# Patient Record
Sex: MALE | Employment: UNEMPLOYED | URBAN - METROPOLITAN AREA
[De-identification: names, ages, dates, MRNs, and addresses within clinical notes are randomized per-mention and may not be internally consistent; named-entity substitution may affect disease eponyms.]

---

## 2018-03-28 ENCOUNTER — HOSPITAL ENCOUNTER (EMERGENCY)
Age: 68
Discharge: STILL ADMITTED OR ADMINISTRATIVE | End: 2018-03-28
Attending: EMERGENCY MEDICINE

## 2018-03-28 PROCEDURE — 75810000275 HC EMERGENCY DEPT VISIT NO LEVEL OF CARE

## 2022-07-27 ENCOUNTER — HOSPITAL ENCOUNTER (OUTPATIENT)
Dept: WOUND CARE | Age: 72
Discharge: HOME | End: 2022-07-27
Payer: MEDICARE

## 2022-07-27 VITALS
TEMPERATURE: 97.9 F | HEART RATE: 89 BPM | SYSTOLIC BLOOD PRESSURE: 114 MMHG | DIASTOLIC BLOOD PRESSURE: 69 MMHG | RESPIRATION RATE: 18 BRPM

## 2022-07-27 DIAGNOSIS — G47.33 OBSTRUCTIVE SLEEP APNEA SYNDROME: ICD-10-CM

## 2022-07-27 DIAGNOSIS — L97.922 NON-PRESSURE CHRONIC ULCER OF LEFT LOWER LEG WITH FAT LAYER EXPOSED (HCC): Primary | ICD-10-CM

## 2022-07-27 DIAGNOSIS — L97.922 NON-PRESSURE CHRONIC ULCER OF LEFT LOWER LEG WITH FAT LAYER EXPOSED (HCC): ICD-10-CM

## 2022-07-27 DIAGNOSIS — R60.0 BILATERAL LEG EDEMA: ICD-10-CM

## 2022-07-27 PROBLEM — I10 HTN (HYPERTENSION): Status: ACTIVE | Noted: 2022-07-27

## 2022-07-27 PROCEDURE — 99203 OFFICE O/P NEW LOW 30 MIN: CPT

## 2022-07-27 PROCEDURE — 29581 APPL MULTLAYER CMPRN SYS LEG: CPT

## 2022-07-27 PROCEDURE — 99203 OFFICE O/P NEW LOW 30 MIN: CPT | Performed by: SURGERY

## 2022-07-27 RX ORDER — BETAMETHASONE DIPROPIONATE 0.5 MG/G
OINTMENT TOPICAL ONCE
Status: CANCELLED | OUTPATIENT
Start: 2022-07-27 | End: 2022-07-27

## 2022-07-27 RX ORDER — VALSARTAN AND HYDROCHLOROTHIAZIDE 160; 12.5 MG/1; MG/1
1 TABLET, FILM COATED ORAL DAILY
COMMUNITY

## 2022-07-27 RX ORDER — FUROSEMIDE 20 MG/1
40 TABLET ORAL 2 TIMES DAILY
COMMUNITY
Start: 2022-08-17

## 2022-07-27 RX ORDER — LIDOCAINE HYDROCHLORIDE 20 MG/ML
JELLY TOPICAL ONCE
Status: CANCELLED | OUTPATIENT
Start: 2022-07-27 | End: 2022-07-27

## 2022-07-27 RX ORDER — GENTAMICIN SULFATE 1 MG/G
OINTMENT TOPICAL ONCE
Status: CANCELLED | OUTPATIENT
Start: 2022-07-27 | End: 2022-07-27

## 2022-07-27 RX ORDER — BACITRACIN ZINC AND POLYMYXIN B SULFATE 500; 1000 [USP'U]/G; [USP'U]/G
OINTMENT TOPICAL ONCE
Status: CANCELLED | OUTPATIENT
Start: 2022-07-27 | End: 2022-07-27

## 2022-07-27 RX ORDER — TRIAMCINOLONE ACETONIDE 1 MG/G
OINTMENT TOPICAL ONCE
Status: CANCELLED | OUTPATIENT
Start: 2022-07-27 | End: 2022-07-27

## 2022-07-27 RX ORDER — LIDOCAINE 40 MG/G
CREAM TOPICAL ONCE
Status: CANCELLED | OUTPATIENT
Start: 2022-07-27 | End: 2022-07-27

## 2022-07-27 RX ORDER — MUPIROCIN 20 MG/G
OINTMENT TOPICAL ONCE
Status: CANCELLED | OUTPATIENT
Start: 2022-07-27 | End: 2022-07-27

## 2022-07-27 RX ORDER — SILVER SULFADIAZINE 10 G/1000G
CREAM TOPICAL ONCE
Status: CANCELLED | OUTPATIENT
Start: 2022-07-27 | End: 2022-07-27

## 2022-07-27 RX ORDER — CLOBETASOL PROPIONATE 0.5 MG/G
OINTMENT TOPICAL ONCE
Status: CANCELLED | OUTPATIENT
Start: 2022-07-27 | End: 2022-07-27

## 2022-07-27 RX ORDER — LIDOCAINE HYDROCHLORIDE 40 MG/ML
SOLUTION TOPICAL ONCE
Status: CANCELLED | OUTPATIENT
Start: 2022-07-27 | End: 2022-07-27

## 2022-07-27 RX ORDER — BACITRACIN 500 [USP'U]/G
OINTMENT TOPICAL ONCE
Status: CANCELLED | OUTPATIENT
Start: 2022-07-27 | End: 2022-07-27

## 2022-07-27 NOTE — WOUND CARE
07/27/22 1349   Wound Leg lower Left;Lateral 07/27/22   Date First Assessed/Time First Assessed: 07/27/22 1345   Wound Approximate Age at First Assessment (Weeks): 4 weeks  Primary Wound Type: Vascular  Location: Leg lower  Wound Location Orientation: Left;Lateral  Date of First Observation: 07/27/22   Wound Image    Wound Etiology Other (Comment)  (Resembles Venous ulcer)   Dressing Status Old drainage noted   Cleansed Soap and water;Cleansed with saline  (Removed gauze, roll gauze)   Dressing/Treatment Other (Comment)   Wound Length (cm) 2.3 cm   Wound Width (cm) 2.3 cm   Wound Depth (cm) 0.2 cm   Wound Surface Area (cm^2) 5.29 cm^2   Wound Volume (cm^3) 1. 058 cm^3   Wound Assessment Slough;Pink/red   Drainage Amount Moderate   Drainage Description Serous   Wound Odor None   Sarah-Wound/Incision Assessment Edematous  (Dry, crusted)   Edges Undefined edges   Wound Thickness Description Full thickness

## 2022-07-27 NOTE — H&P
[address]  HISTORY AND PHYSICAL    Name:  Conrad Karimi  MR#:  [id number]  :  1950  ACCOUNT #:  [de-identified]  ADMIT DATE:  2022    HISTORY OF PRESENT ILLNESS:  The patient is a 57-year-old man who is referred to wound care center regarding a nonhealing wound on the left lower leg. The patient reports the wound has been present for about a month. He does report a chronic history of swelling of the legs, with left leg being larger. He has had previous ulceration on the left leg. The patient does not have any specific history of blood clot in the left leg. The patient does not have history of diabetes mellitus. He does not report anginal symptoms and does not have history of MI or coronary intervention. He is ambulatory. He reports some dyspnea with exertion, but says he could walk 100 yards without stopping. The patient sleeps lying in bed. He does not sleep sitting in a chair. The patient's past medical history includes hypertension and sleep apnea. He tried but was not successful using CPAP. The patient's medications include furosemide 20 mg per day. He reports this does produce a diuresis. He reports he drinks about a gallon of water per day. PHYSICAL EXAMINATION:  VITAL SIGNS:  Blood pressure 114/69, pulse 89, respirations 18, temperature 97.9. GENERAL:  The patient is an alert, overweight man in no acute distress. HEAD AND NECK:  Examination showed no jaundice. LUNGS:  Clear bilaterally without rales, rhonchi or wheezes. HEART:  Regular without murmur, gallop or rub. NEUROLOGIC:  The patient is alert and oriented. He moves all extremities equally. Facial movement is symmetrical.  Speech is normal.    WOUND EXAMINATION:  Examination of the right lower extremity revealed 2+ dorsalis pedis pulse. There was 1+ pitting edema from the knee downward on the right. There was a suggestion of some dilated veins in the right lower leg.   No ulcerations present in the right lower leg. Examination of the left lower extremity revealed 2+ dorsalis pedis pulse. There was 2+ pitting edema from the knee distally on the left. There is ulcer on the lateral distal aspect of the left lower leg which is 2.3 x 2.3 x 0.2 cm in dimension with slough on its base. I explained to the patient that his ulcer is likely related to chronic edema in the leg. Potential sources of edema reviewed. I ordered bilateral duplex ultrasound to assess for venous insufficiency. I explained to the patient that since he is taking a diuretic for treatment of his leg swelling, he should not drink excess fluid. As a start, I would recommend reducing daily water intake from one gallon per day to one half a gallon per day. Normally, if an individual has leg edema and is receiving a diuretic, I would recommend that he drink moderate amounts of fluid with meals and little fluid between meals. I also discussed with the patient the need to avoid salt intake in his diet. Dressing ordered:  Apply Xeroform over ulcer, apply two-layer compression wrap with calamine from base of toes to upper calf at 0.50 stretch. The patient will need to use a cast cover for showering. The patient will follow up in wound care center in 1 week. FINAL DIAGNOSIS:  Nonpressure ulcer left lower leg with fat layer exposed, bilateral leg edema, left greater than right, untreated sleep apnea.       Yamile Burgos MD      [id number]  D:  07/27/2022 14:25  T:  07/27/2022 19:20  JOB #:  [id number]

## 2022-07-27 NOTE — DISCHARGE INSTRUCTIONS
Discharge Instructions [address], [address] [place] [address] Laya Starkey, [id number]  Telephone: [phone]    NAME:  Angelica Yost  YOB: 1950  MEDICAL RECORD NUMBER:  [id number]  DATE: 07/27/2022    WOUND CARE ORDERS:  Left Lateral Lower Leg wound - Cleanse with Normal Saline or mild soap & water. Apply Xeroform, cover w/gauze or ABD pad, followed by 2 layer compression wrap w/Calamine. Dressing to be changed 1 x week, in clinic. Return to clinic in 1 week for follow up. Testing Ordered : Venous Duplex Ultrasound Studies, Bilateral Lower Extremities. (Left Lower Leg Ulcer, Bilateral Lower Extremity Edema) @ [facility]. Call [address] [place] [address] for Nurse Visit after Venous testing if they are not able to reapply dressing/compression wrap. TREATMENT ORDERS:    Elevate leg(s) when sitting. Avoid prolonged standing in one place. Do not get dressing/wrap wet. You may use a \"cast cover\" to protect wrap when showering. Follow Diet as prescribed:   [] Diet as tolerated: [] Calorie Diabetic Diet: Low carb and no Sugar [x] No Added Salt  [] Increase Protein: [x] Limit the amount of liquid you are drinking and avoid drinking in between meals           Return Appointment:  [x] Return Appointment: With DR Prachi Dowell  in  1 Week(s)        Ordered Tests - Venous Duplex Ultrasound Studies of right & left legs @ [facility] on Friday, July 29th @ 1:00 pm. Please arrive at 12:30 pm.     Electronically signed Mariana Clark RN on 7/27/2022 at 2:12 PM     Kamilla Lazar 281: Should you experience any significant changes in your wound(s) or have questions about your wound care, please contact the [address] [place] [address] at [address] [place] [address] 8:00 am - 4:30.   If you need help with your wound outside these hours and cannot wait until we are again available, contact your PCP or go to the [facility] emergency room. PLEASE NOTE: IF YOU ARE UNABLE TO OBTAIN WOUND SUPPLIES, CONTINUE TO USE THE SUPPLIES YOU HAVE AVAILABLE UNTIL YOU ARE ABLE TO REACH US. IT IS MOST IMPORTANT TO KEEP THE WOUND COVERED AT ALL TIMES.      Physician Signature:_______________________    Date: ___________ Time:  ____________

## 2022-07-27 NOTE — WOUND CARE
Multilayer Compression Wrap   (Not Unna) Below the Knee    NAME:  Nasrin Brock  YOB: 1950  MEDICAL RECORD NUMBER:  [id number]  DATE:  7/27/2022    Removed old Multilayer wrap if indicated and wash leg with mild soap/water. Applied primary and secondary dressing as ordered. Applied multilayered dressing below the knee to left lower leg. Instructed patient/caregiver not to remove dressing and to keep it clean and dry. Instructed patient/caregiver on complications to report to provider, such as pain, numbness in toes, heavy drainage, and slippage of dressing. Instructed patient on purpose of compression dressing and on activity and exercise recommendations. 07/27/22 1420   Wound Leg lower Left;Lateral 07/27/22   Date First Assessed/Time First Assessed: 07/27/22 1345   Wound Approximate Age at First Assessment (Weeks): 4 weeks  Primary Wound Type: Vascular  Location: Leg lower  Wound Location Orientation: Left;Lateral  Date of First Observation: 07/27/22   Dressing Status New dressing applied   Cleansed Cleansed with saline   Dressing/Treatment Xeroform;Gauze dressing/dressing sponge;ABD pad; Other (Comment)  (2 layer compression wrap w/Calamine.)         Electronically signed by Ahmed Libman, RN on 7/27/2022 at 2:46 PM

## 2022-08-03 ENCOUNTER — HOSPITAL ENCOUNTER (OUTPATIENT)
Dept: WOUND CARE | Age: 72
Discharge: HOME | End: 2022-08-03
Payer: MEDICARE

## 2022-08-03 VITALS
DIASTOLIC BLOOD PRESSURE: 82 MMHG | TEMPERATURE: 97.9 F | RESPIRATION RATE: 18 BRPM | SYSTOLIC BLOOD PRESSURE: 138 MMHG | HEART RATE: 100 BPM

## 2022-08-03 DIAGNOSIS — L97.922 NON-PRESSURE CHRONIC ULCER OF LEFT LOWER LEG WITH FAT LAYER EXPOSED (HCC): Primary | ICD-10-CM

## 2022-08-03 DIAGNOSIS — G47.33 OBSTRUCTIVE SLEEP APNEA SYNDROME: ICD-10-CM

## 2022-08-03 DIAGNOSIS — R60.0 BILATERAL LEG EDEMA: ICD-10-CM

## 2022-08-03 PROCEDURE — 99214 OFFICE O/P EST MOD 30 MIN: CPT | Performed by: SURGERY

## 2022-08-03 PROCEDURE — 29581 APPL MULTLAYER CMPRN SYS LEG: CPT

## 2022-08-03 RX ORDER — BACITRACIN 500 [USP'U]/G
OINTMENT TOPICAL ONCE
Status: CANCELLED | OUTPATIENT
Start: 2022-08-03 | End: 2022-08-03

## 2022-08-03 RX ORDER — LIDOCAINE 40 MG/G
CREAM TOPICAL ONCE
Status: CANCELLED | OUTPATIENT
Start: 2022-08-03 | End: 2022-08-03

## 2022-08-03 RX ORDER — MUPIROCIN 20 MG/G
OINTMENT TOPICAL ONCE
Status: CANCELLED | OUTPATIENT
Start: 2022-08-03 | End: 2022-08-03

## 2022-08-03 RX ORDER — LIDOCAINE HYDROCHLORIDE 40 MG/ML
SOLUTION TOPICAL ONCE
Status: CANCELLED | OUTPATIENT
Start: 2022-08-03 | End: 2022-08-03

## 2022-08-03 RX ORDER — SILVER SULFADIAZINE 10 G/1000G
CREAM TOPICAL ONCE
Status: CANCELLED | OUTPATIENT
Start: 2022-08-03 | End: 2022-08-03

## 2022-08-03 RX ORDER — TRIAMCINOLONE ACETONIDE 1 MG/G
OINTMENT TOPICAL ONCE
Status: CANCELLED | OUTPATIENT
Start: 2022-08-03 | End: 2022-08-03

## 2022-08-03 RX ORDER — GENTAMICIN SULFATE 1 MG/G
OINTMENT TOPICAL ONCE
Status: CANCELLED | OUTPATIENT
Start: 2022-08-03 | End: 2022-08-03

## 2022-08-03 RX ORDER — BETAMETHASONE DIPROPIONATE 0.5 MG/G
OINTMENT TOPICAL ONCE
Status: CANCELLED | OUTPATIENT
Start: 2022-08-03 | End: 2022-08-03

## 2022-08-03 RX ORDER — LIDOCAINE HYDROCHLORIDE 20 MG/ML
JELLY TOPICAL ONCE
Status: CANCELLED | OUTPATIENT
Start: 2022-08-03 | End: 2022-08-03

## 2022-08-03 RX ORDER — BACITRACIN ZINC AND POLYMYXIN B SULFATE 500; 1000 [USP'U]/G; [USP'U]/G
OINTMENT TOPICAL ONCE
Status: CANCELLED | OUTPATIENT
Start: 2022-08-03 | End: 2022-08-03

## 2022-08-03 RX ORDER — CLOBETASOL PROPIONATE 0.5 MG/G
OINTMENT TOPICAL ONCE
Status: CANCELLED | OUTPATIENT
Start: 2022-08-03 | End: 2022-08-03

## 2022-08-03 NOTE — WOUND CARE
08/03/22 1527   Wound Leg lower Left;Lateral 07/27/22   Date First Assessed/Time First Assessed: 07/27/22 1345   Wound Approximate Age at First Assessment (Weeks): 4 weeks  Primary Wound Type: Vascular  Location: Leg lower  Wound Location Orientation: Left;Lateral  Date of First Observation: 07/27/22   Dressing/Treatment   (Xeroform, ABD, 2 layer with calamine)   Multilayer Compression Wrap   (Not Unna) Below the Knee    NAME:  Genny Blount  YOB: 1950  MEDICAL RECORD NUMBER:  [id number]  DATE:  8/3/2022    Removed old Multilayer wrap if indicated and wash leg with mild soap/water. Applied moisturizing agent to dry skin as needed. Applied primary and secondary dressing as ordered. Applied multilayered dressing below the knee to left lower leg. Instructed patient/caregiver not to remove dressing and to keep it clean and dry. Instructed patient/caregiver on complications to report to provider, such as pain, numbness in toes, heavy drainage, and slippage of dressing. Instructed patient on purpose of compression dressing and on activity and exercise recommendations.     Response to treatment: Well tolerated by patient       Electronically signed by Veronica Garvey RN on 8/3/2022 at 3:28 PM

## 2022-08-03 NOTE — WOUND CARE
08/03/22 1511   Left Leg Edema Point of Measurement   Leg circumference 46 cm   Ankle circumference 30 cm   Compression Therapy 2 layer compression wrap   LLE Peripheral Vascular    Capillary Refill Less than/equal to 3 seconds   Color Appropriate for race   Temperature Warm   Sensation Present   Pedal Pulse Palpable   Wound Leg lower Left;Lateral 07/27/22   Date First Assessed/Time First Assessed: 07/27/22 1345   Wound Approximate Age at First Assessment (Weeks): 4 weeks  Primary Wound Type: Vascular  Location: Leg lower  Wound Location Orientation: Left;Lateral  Date of First Observation: 07/27/22   Wound Image    Wound Etiology Venous   Dressing Status Old drainage noted   Cleansed Cleansed with saline   Dressing/Treatment   (Xeroform, G, RG, 2 layer with calamine)   Wound Length (cm) 1.8 cm   Wound Width (cm) 2.2 cm   Wound Depth (cm) 0.2 cm   Wound Surface Area (cm^2) 3.96 cm^2   Change in Wound Size % 25.14   Wound Volume (cm^3) 0.792 cm^3   Wound Healing % 25   Wound Assessment Slough;Pink/red   Drainage Amount Moderate   Drainage Description Serous   Wound Odor None   Sarah-Wound/Incision Assessment Intact   Edges Defined edges   Wound Thickness Description Full thickness   Visit Vitals  /82   Pulse 100   Temp 97.9 °F (36.6 °C)   Resp 18

## 2022-08-03 NOTE — PROGRESS NOTES
HISTORY OF PRESENT ILLNESS:  The patient is a 65-year-old man who is referred to wound care center regarding a nonhealing wound on the left lower leg. The patient was first seen at the [address] [place] [address] on 7/27/2022. The patient reports the wound has been present for about a month. He does report a chronic history of swelling of the legs, with left leg being larger. He has had previous ulceration on the left leg. The patient does not have any specific history of blood clot in the left leg. The patient does not have history of diabetes mellitus. He does not report anginal symptoms and does not have history of MI or coronary intervention. He is ambulatory. He reports some dyspnea with exertion, but says he could walk 100 yards without stopping. The patient sleeps lying in bed. He does not sleep sitting in a chair. The patient's past medical history includes hypertension and sleep apnea. He tried but was not successful using CPAP. The patient's medications include furosemide 20 mg per day. He reports this does produce a diuresis. He reports he had been drinking about a gallon of water per day. He has reduced intake. The patient had venous US of lower extremities at [facility] on 7/29/2022: In the right lower extremity, there was no evidence oif DVT. No venous insufficiency in deep or superficial systems. .    In the left lower extremity, there was no DVT. Reflux noted only in left SSV and a tribyutary from it in mid calf. The SFJ could not be identified. PHYSICAL EXAMINATION:    GENERAL:  The patient is an alert, overweight man in no acute distress. WOUND EXAMINATION:  Examination of the right lower extremity revealed 2+ dorsalis pedis pulse. There was 1+ pitting edema from the knee downward on the right. There was a suggestion of some dilated veins in the right lower leg. No ulcerations present in the right lower leg. Examination of the left lower extremity revealed 2+ dorsalis pedis pulse. There was 2+ pitting edema from the knee distally on the left. There is ulcer on the lateral distal aspect of the left lower leg which is 1.8 x 2.2 x 0.2 cm in dimension with granulation (50%) and mild slough on its base. I explained to the patient that his ulcer is likely related to chronic edema in the leg. Potential sources of edema reviewed. As he has no venous reflux on the right  and does have pitting edema, I suspect he has systemic source for edema. Mild venous reflux on left is additive. I explained to the patient that since he is taking a diuretic for treatment of his leg swelling, he should not drink excess fluid. As a start, I would recommend reducing daily water intake from one gallon per day to one half a gallon per day. Normally, if an individual has leg edema and is receiving a diuretic, I would recommend that he drink moderate amounts of fluid with meals and little fluid between meals. I also discussed with the patient the need to avoid salt intake in his diet. Call to primary provider asking if patient could tolerate a higher dose of diuretic. Dressing ordered:  Apply Xeroform over ulcer, apply two-layer compression wrap with calamine from base of toes to upper calf at 0.50 stretch. The patient will need to use a cast cover for showering. The patient will follow up in wound care center in 1 week. FINAL DIAGNOSIS:  Nonpressure ulcer left lower leg with fat layer exposed, bilateral leg edema, left greater than right, untreated sleep apnea.     L97.922, R60.0, G47.33        Sandra Ogden MD

## 2022-08-03 NOTE — DISCHARGE INSTRUCTIONS
Discharge Instructions [address], [address] [place] [address] Ne, [id number]  Telephone: [phone]    NAME:  Genny Blount  YOB: 1950  MEDICAL RECORD NUMBER:  [id number]  DATE:  8/3/2022  WOUND CARE ORDERS:  Left lateral lower leg wound :Cleanse with saline , apply primary dressing xeroform cover with secondary dressing   abd pad . Apply 2 layers with Calamine  Pt./pcg/HH nurse to change (freq) once a week in clinic and as needed for compromise. Nurse visit 1 week, MD 2 weeks. TREATMENT ORDERS:    Elevate leg(s) above the level of the heart when sitting. Avoid prolonged standing in one place. Do no get dressing/wrap wet. Follow Diet as prescribed:   [x] Diet as tolerated: [] Calorie Diabetic Diet: Low carb and no Sugar [x] No Added Salt:  [x] Increase Protein: [] Limit the amount of liquid you are drinking and avoid drinking in between meals           Return Appointment:  [x] Return Appointment: With DR Anna Easton  in  2 [facility])  [x] Nurse Visit : 7 days  [] Ordered tests:    Electronically signed Veronica Garvey RN on 8/3/2022 at 3:25 PM     Kamilla Lazar 281: Should you experience any significant changes in your wound(s) or have questions about your wound care, please contact the [address] [place] [address] at [address] [place] [address] 8:00 am - 4:30. If you need help with your wound outside these hours and cannot wait until we are again available, contact your PCP or go to the hospital emergency room. PLEASE NOTE: IF YOU ARE UNABLE TO OBTAIN WOUND SUPPLIES, CONTINUE TO USE THE SUPPLIES YOU HAVE AVAILABLE UNTIL YOU ARE ABLE TO REACH US. IT IS MOST IMPORTANT TO KEEP THE WOUND COVERED AT ALL TIMES.      Physician Signature:_______________________    Date: ___________ Time:  ____________

## 2022-08-05 ENCOUNTER — DOCUMENTATION ONLY (OUTPATIENT)
Dept: SURGERY | Age: 72
End: 2022-08-05

## 2022-08-05 NOTE — PROGRESS NOTES
Wound Care    I spoke with primary NP Alexandria Ferreira about edema. She asked that lasix be increased to 40 mg bid. [address] [facility] staff informed patient. Patient to contact primary office about lab follow up around 9/1/2022.     Christian Jacques MD

## 2022-08-05 NOTE — WOUND CARE
Patient called back. Patient is on 20 mg of lasix. Dr Jose L Wiggins wanted the patient to take 40 mg of lasix daily. Patient was informed to take 40 mg daily and to call the PCP to set Labs on 9/1/22. Patient verbalized understanding the direction. Patient was able to verbalize the instruction back. No question at this time. Will reiterate on follow up appointment.

## 2022-08-09 ENCOUNTER — HOSPITAL ENCOUNTER (OUTPATIENT)
Dept: WOUND CARE | Age: 72
Discharge: HOME | End: 2022-08-09
Payer: MEDICARE

## 2022-08-09 VITALS
HEART RATE: 107 BPM | RESPIRATION RATE: 18 BRPM | TEMPERATURE: 97.8 F | DIASTOLIC BLOOD PRESSURE: 81 MMHG | SYSTOLIC BLOOD PRESSURE: 137 MMHG

## 2022-08-09 DIAGNOSIS — L97.922 NON-PRESSURE CHRONIC ULCER OF LEFT LOWER LEG WITH FAT LAYER EXPOSED (HCC): Primary | ICD-10-CM

## 2022-08-09 PROCEDURE — 29581 APPL MULTLAYER CMPRN SYS LEG: CPT

## 2022-08-09 RX ORDER — BETAMETHASONE DIPROPIONATE 0.5 MG/G
OINTMENT TOPICAL ONCE
Status: CANCELLED | OUTPATIENT
Start: 2022-08-09 | End: 2022-08-09

## 2022-08-09 RX ORDER — MUPIROCIN 20 MG/G
OINTMENT TOPICAL ONCE
Status: CANCELLED | OUTPATIENT
Start: 2022-08-09 | End: 2022-08-09

## 2022-08-09 RX ORDER — LIDOCAINE HYDROCHLORIDE 20 MG/ML
JELLY TOPICAL ONCE
Status: CANCELLED | OUTPATIENT
Start: 2022-08-09 | End: 2022-08-09

## 2022-08-09 RX ORDER — LIDOCAINE 40 MG/G
CREAM TOPICAL ONCE
Status: CANCELLED | OUTPATIENT
Start: 2022-08-09 | End: 2022-08-09

## 2022-08-09 RX ORDER — GENTAMICIN SULFATE 1 MG/G
OINTMENT TOPICAL ONCE
Status: CANCELLED | OUTPATIENT
Start: 2022-08-09 | End: 2022-08-09

## 2022-08-09 RX ORDER — TRIAMCINOLONE ACETONIDE 1 MG/G
OINTMENT TOPICAL ONCE
Status: CANCELLED | OUTPATIENT
Start: 2022-08-09 | End: 2022-08-09

## 2022-08-09 RX ORDER — LIDOCAINE HYDROCHLORIDE 40 MG/ML
SOLUTION TOPICAL ONCE
Status: CANCELLED | OUTPATIENT
Start: 2022-08-09 | End: 2022-08-09

## 2022-08-09 RX ORDER — BACITRACIN 500 [USP'U]/G
OINTMENT TOPICAL ONCE
Status: CANCELLED | OUTPATIENT
Start: 2022-08-09 | End: 2022-08-09

## 2022-08-09 RX ORDER — CLOBETASOL PROPIONATE 0.5 MG/G
OINTMENT TOPICAL ONCE
Status: CANCELLED | OUTPATIENT
Start: 2022-08-09 | End: 2022-08-09

## 2022-08-09 RX ORDER — BACITRACIN ZINC AND POLYMYXIN B SULFATE 500; 1000 [USP'U]/G; [USP'U]/G
OINTMENT TOPICAL ONCE
Status: CANCELLED | OUTPATIENT
Start: 2022-08-09 | End: 2022-08-09

## 2022-08-09 RX ORDER — SILVER SULFADIAZINE 10 G/1000G
CREAM TOPICAL ONCE
Status: CANCELLED | OUTPATIENT
Start: 2022-08-09 | End: 2022-08-09

## 2022-08-09 NOTE — WOUND CARE
08/09/22 1311   Wound Leg lower Left;Lateral 07/27/22   Date First Assessed/Time First Assessed: 07/27/22 1345   Wound Approximate Age at First Assessment (Weeks): 4 weeks  Primary Wound Type: Vascular  Location: Leg lower  Wound Location Orientation: Left;Lateral  Date of First Observation: 07/27/22   Wound Etiology Venous   Dressing Status Old drainage noted  (removed xeroform, abd, 2 layer calamine wrap)   Cleansed Soap and water   Dressing/Treatment Xeroform;ABD pad  (2 layer calamine wrap)   Wound Assessment [place]/red;Slough   Drainage Amount Small   Drainage Description Serous   Wound Odor None   Sarah-Wound/Incision Assessment Intact   Edges Defined edges   Wound Thickness Description Full thickness   Multilayer Compression Wrap   (Not Unna) Below the Knee    NAME:  Genny Blount  YOB: 1950  MEDICAL RECORD NUMBER:  [id number]  DATE:  8/9/2022    Removed old Multilayer wrap if indicated and wash leg with mild soap/water. Applied moisturizing agent to dry skin as needed. Applied primary and secondary dressing as ordered. Applied multilayered dressing below the knee to left lower leg. Instructed patient/caregiver not to remove dressing and to keep it clean and dry. Instructed patient/caregiver on complications to report to provider, such as pain, numbness in toes, heavy drainage, and slippage of dressing. Instructed patient on purpose of compression dressing and on activity and exercise recommendations.       Electronically signed by Orlando Russo RN on 8/9/2022 at 1:14 PM

## 2022-08-17 ENCOUNTER — HOSPITAL ENCOUNTER (OUTPATIENT)
Dept: WOUND CARE | Age: 72
Discharge: HOME | End: 2022-08-17
Payer: MEDICARE

## 2022-08-17 VITALS
RESPIRATION RATE: 18 BRPM | DIASTOLIC BLOOD PRESSURE: 85 MMHG | HEART RATE: 91 BPM | SYSTOLIC BLOOD PRESSURE: 140 MMHG | TEMPERATURE: 97.7 F

## 2022-08-17 DIAGNOSIS — L97.922 NON-PRESSURE CHRONIC ULCER OF LEFT LOWER LEG WITH FAT LAYER EXPOSED (HCC): Primary | ICD-10-CM

## 2022-08-17 DIAGNOSIS — R60.0 BILATERAL LEG EDEMA: ICD-10-CM

## 2022-08-17 PROCEDURE — 29581 APPL MULTLAYER CMPRN SYS LEG: CPT

## 2022-08-17 PROCEDURE — 99214 OFFICE O/P EST MOD 30 MIN: CPT | Performed by: SURGERY

## 2022-08-17 RX ORDER — TRIAMCINOLONE ACETONIDE 1 MG/G
OINTMENT TOPICAL ONCE
Status: CANCELLED | OUTPATIENT
Start: 2022-08-17 | End: 2022-08-17

## 2022-08-17 RX ORDER — CLOBETASOL PROPIONATE 0.5 MG/G
OINTMENT TOPICAL ONCE
Status: CANCELLED | OUTPATIENT
Start: 2022-08-17 | End: 2022-08-17

## 2022-08-17 RX ORDER — BETAMETHASONE DIPROPIONATE 0.5 MG/G
OINTMENT TOPICAL ONCE
Status: CANCELLED | OUTPATIENT
Start: 2022-08-17 | End: 2022-08-17

## 2022-08-17 RX ORDER — LIDOCAINE HYDROCHLORIDE 20 MG/ML
JELLY TOPICAL ONCE
Status: CANCELLED | OUTPATIENT
Start: 2022-08-17 | End: 2022-08-17

## 2022-08-17 RX ORDER — BACITRACIN 500 [USP'U]/G
OINTMENT TOPICAL ONCE
Status: CANCELLED | OUTPATIENT
Start: 2022-08-17 | End: 2022-08-17

## 2022-08-17 RX ORDER — LIDOCAINE HYDROCHLORIDE 40 MG/ML
SOLUTION TOPICAL ONCE
Status: CANCELLED | OUTPATIENT
Start: 2022-08-17 | End: 2022-08-17

## 2022-08-17 RX ORDER — BACITRACIN ZINC AND POLYMYXIN B SULFATE 500; 1000 [USP'U]/G; [USP'U]/G
OINTMENT TOPICAL ONCE
Status: CANCELLED | OUTPATIENT
Start: 2022-08-17 | End: 2022-08-17

## 2022-08-17 RX ORDER — GENTAMICIN SULFATE 1 MG/G
OINTMENT TOPICAL ONCE
Status: CANCELLED | OUTPATIENT
Start: 2022-08-17 | End: 2022-08-17

## 2022-08-17 RX ORDER — SILVER SULFADIAZINE 10 G/1000G
CREAM TOPICAL ONCE
Status: CANCELLED | OUTPATIENT
Start: 2022-08-17 | End: 2022-08-17

## 2022-08-17 RX ORDER — LIDOCAINE 40 MG/G
CREAM TOPICAL ONCE
Status: CANCELLED | OUTPATIENT
Start: 2022-08-17 | End: 2022-08-17

## 2022-08-17 RX ORDER — MUPIROCIN 20 MG/G
OINTMENT TOPICAL ONCE
Status: CANCELLED | OUTPATIENT
Start: 2022-08-17 | End: 2022-08-17

## 2022-08-17 NOTE — PROGRESS NOTES
HISTORY OF PRESENT ILLNESS:  The patient is a 44-year-old man who is referred to wound care center regarding a nonhealing wound on the left lower leg. The patient was first seen at the [address] [place] [address] on 7/27/2022. The patient reports the wound has been present for about a month. He does report a chronic history of swelling of the legs, with left leg being larger. He has had previous ulceration on the left leg. The patient does not have any specific history of blood clot in the left leg. The patient does not have history of diabetes mellitus. He does not report anginal symptoms and does not have history of MI or coronary intervention. He is ambulatory. He reports some dyspnea with exertion, but says he could walk 100 yards without stopping. The patient sleeps lying in bed. He does not sleep sitting in a chair. The patient's past medical history includes hypertension and sleep apnea. He tried but was not successful using CPAP. The patient's medications include furosemide 20 mg per day. He reports this does produce a diuresis. He reports he had been drinking about a gallon of water per day. He has reduced intake. I spoke with primary NP Ester Parks about edema on 8/5/2022. She asked that lasix be increased to 40 mg bid[facility]. Patient to contact primary office about lab follow up around 9/1/2022. However, on 8/17/2022, patient said he had not changed his present lasix 20 mg two tabs once daily. The patient had venous US of lower extremities at [facility] on 7/29/2022: In the right lower extremity, there was no evidence of DVT. No venous insufficiency in deep or superficial systems. .     In the left lower extremity, there was no DVT. Reflux noted only in left SSV and a tributary from it in mid calf. The SFJ could not be identified.       Dressing as of 8/3/2022:  Apply Xeroform over ulcer, apply two-layer compression wrap with calamine from base of toes to upper calf at 0.50 stretch. PHYSICAL EXAMINATION:     GENERAL:  The patient is an alert, overweight man in no acute distress. WOUND EXAMINATION:  Examination of the right lower extremity revealed 2+ dorsalis pedis pulse. There was 1+ pitting edema from the knee downward on the right. There was a suggestion of some dilated veins in the right lower leg. No ulcerations present in the right lower leg. Examination of the left lower extremity revealed 2+ dorsalis pedis pulse. There was 2+ pitting edema from the knee distally on the left. There is ulcer on the lateral distal aspect of the left lower leg which is 2.6 x 3.2 x 0.1 cm in dimension with granulation (50%) and mild slough on its base. New more proximal ulcer 0.2 x 0.3 x 0.1 cm. I explained to the patient that his ulcer is likely related to chronic edema in the leg. Potential sources of edema reviewed. As he has no venous reflux on the right  and does have pitting edema, I suspect he has systemic source for edema. Mild venous reflux on left is additive. The patient instructed to start taking the lasix 40 mg twice daily. I would recommend that he drink moderate amounts of fluid with meals and little fluid between meals. I also discussed with the patient the need to avoid salt intake in his diet. Change contact layer:     Dressing ordered:  Apply Acticoat over ulcer, apply two-layer compression wrap with calamine from base of toes to upper calf at 0.50 stretch. The patient will need to use a cast cover for showering. The patient will follow up in wound care center in 1 week. FINAL DIAGNOSIS:  Nonpressure ulcer left lower leg with fat layer exposed, bilateral leg edema, left greater than right, untreated sleep apnea.      L97.922, R60.0, G47.33        Jane Dean MD

## 2022-08-17 NOTE — DISCHARGE INSTRUCTIONS
Discharge Instructions [address], [address] [place] [address] Laya Starkey, [id number]  Telephone: [phone]    NAME:  Hillary Barrett  YOB: 1950  MEDICAL RECORD NUMBER:  [id number]  DATE:  8/17/2022  WOUND CARE ORDERS:  Left lower leg wounds :Cleanse with saline , apply primary dressing Acticoat Flex 7 cover with secondary dressing   abd pad . Apply 2 layers with Calamine  F/U in 1 week. Limit how much fluids you take. You should be taking 2 of the 20 mg tabs of furosemide or a total of 40 mg furosemide 2 times a day. TREATMENT ORDERS:    Elevate leg(s) above the level of the heart when sitting. Avoid prolonged standing in one place. Do no get dressing/wrap wet. Follow Diet as prescribed:   [] Diet as tolerated: [] Calorie Diabetic Diet: Low carb and no Sugar [] No Added Salt:  [] Increase Protein: [] Limit the amount of liquid you are drinking and avoid drinking in between meals           Return Appointment:  [x] Return Appointment: With DR Buddy López  in  [facility])  [] Nurse Visit   [] Ordered tests:    Electronically signed Fay Parra RN on 8/17/2022 at 3:31 PM     [address] Information: Should you experience any significant changes in your wound(s) or have questions about your wound care, please contact the [address] [place] [address] at [address] [place] [address] 8:00 am - 4:30. If you need help with your wound outside these hours and cannot wait until we are again available, contact your PCP or go to the hospital emergency room. PLEASE NOTE: IF YOU ARE UNABLE TO OBTAIN WOUND SUPPLIES, CONTINUE TO USE THE SUPPLIES YOU HAVE AVAILABLE UNTIL YOU ARE ABLE TO REACH US. IT IS MOST IMPORTANT TO KEEP THE WOUND COVERED AT ALL TIMES.      Physician Signature:_______________________    Date: ___________ Time:  ____________

## 2022-08-17 NOTE — WOUND CARE
08/17/22 1506   Wound Leg lower Left;Lateral;Distal 07/27/22   Date First Assessed/Time First Assessed: 07/27/22 1345   Wound Approximate Age at First Assessment (Weeks): 4 weeks  Primary Wound Type: Vascular  Location: Leg lower  Wound Location Orientation: Left;Lateral;Distal  Date of First Observation: 07/27/22   Wound Image    Wound Etiology Venous   Dressing Status Old drainage noted   Cleansed Cleansed with saline; Soap and water   Dressing/Treatment   (Xeroform, ABD, 2 layer with calamine)   Wound Length (cm) 2.6 cm   Wound Width (cm) 3.2 cm   Wound Depth (cm) 0.1 cm   Wound Surface Area (cm^2) 8.32 cm^2   Change in Wound Size % -57.28   Wound Volume (cm^3) 0.832 cm^3   Wound Healing % 21   Wound Assessment [place]/red;Slough   Drainage Amount Moderate   Drainage Description Serous   Wound Odor None   Sarah-Wound/Incision Assessment Intact   Edges Defined edges   Wound Thickness Description Full thickness   Wound Leg lower Left;Lateral;Proximal #2 08/17/22   Date First Assessed/Time First Assessed: 08/17/22 1507   Present on Hospital Admission: Yes  Wound Approximate Age at First Assessment (Weeks): 1 weeks  Primary Wound Type: Venous Ulcer  Location: Leg lower  Wound Location Orientation: Left;Lateral;Pr. .. Wound Etiology Venous   Dressing Status Old drainage noted   Cleansed Cleansed with saline; Soap and water   Dressing/Treatment   (Xeroform, ABD, 2 layer with calamine)   Wound Length (cm) 0.2 cm   Wound Width (cm) 0.3 cm   Wound Depth (cm) 0.1 cm   Wound Surface Area (cm^2) 0.06 cm^2   Wound Volume (cm^3) 0.006 cm^3   Wound Assessment Pink/red   Drainage Amount Small   Drainage Description Serous   Wound Odor None   Sarah-Wound/Incision Assessment Intact   Edges Undefined edges   Wound Thickness Description Partial thickness   Visit Vitals  BP (!) 140/85 (BP 1 Location: Left upper arm, BP Patient Position: At rest)   Pulse 91   Temp 97.7 °F (36.5 °C)   Resp 18

## 2022-08-17 NOTE — WOUND CARE
Multilayer Compression Wrap   (Not Unna) Below the Knee    NAME:  Darnelle Runner  YOB: 1950  MEDICAL RECORD NUMBER:  [id number]  DATE:  8/17/2022    Removed old Multilayer wrap if indicated and wash leg with mild soap/water. Applied moisturizing agent to dry skin as needed. Applied primary and secondary dressing as ordered. Applied multilayered dressing below the knee to left lower leg. Instructed patient/caregiver not to remove dressing and to keep it clean and dry. Instructed patient/caregiver on complications to report to provider, such as pain, numbness in toes, heavy drainage, and slippage of dressing. Instructed patient on purpose of compression dressing and on activity and exercise recommendations.       Electronically signed by Bashir Perez RN on 8/17/2022 at 3:41 PM

## 2022-08-24 ENCOUNTER — HOSPITAL ENCOUNTER (OUTPATIENT)
Dept: WOUND CARE | Age: 72
Discharge: HOME | End: 2022-08-24
Payer: MEDICARE

## 2022-08-24 VITALS
HEART RATE: 94 BPM | RESPIRATION RATE: 18 BRPM | TEMPERATURE: 97.9 F | DIASTOLIC BLOOD PRESSURE: 80 MMHG | SYSTOLIC BLOOD PRESSURE: 127 MMHG

## 2022-08-24 DIAGNOSIS — L97.922 NON-PRESSURE CHRONIC ULCER OF LEFT LOWER LEG WITH FAT LAYER EXPOSED (HCC): ICD-10-CM

## 2022-08-24 DIAGNOSIS — L97.922 NON-PRESSURE CHRONIC ULCER OF LEFT LOWER LEG WITH FAT LAYER EXPOSED (HCC): Primary | ICD-10-CM

## 2022-08-24 DIAGNOSIS — R60.0 BILATERAL LEG EDEMA: ICD-10-CM

## 2022-08-24 PROCEDURE — 99213 OFFICE O/P EST LOW 20 MIN: CPT | Performed by: SURGERY

## 2022-08-24 PROCEDURE — 29581 APPL MULTLAYER CMPRN SYS LEG: CPT

## 2022-08-24 RX ORDER — GENTAMICIN SULFATE 1 MG/G
OINTMENT TOPICAL ONCE
Status: CANCELLED | OUTPATIENT
Start: 2022-08-24 | End: 2022-08-24

## 2022-08-24 RX ORDER — BETAMETHASONE DIPROPIONATE 0.5 MG/G
OINTMENT TOPICAL ONCE
Status: CANCELLED | OUTPATIENT
Start: 2022-08-24 | End: 2022-08-24

## 2022-08-24 RX ORDER — LIDOCAINE 40 MG/G
CREAM TOPICAL ONCE
Status: CANCELLED | OUTPATIENT
Start: 2022-08-24 | End: 2022-08-24

## 2022-08-24 RX ORDER — MUPIROCIN 20 MG/G
OINTMENT TOPICAL ONCE
Status: CANCELLED | OUTPATIENT
Start: 2022-08-24 | End: 2022-08-24

## 2022-08-24 RX ORDER — SILVER SULFADIAZINE 10 G/1000G
CREAM TOPICAL ONCE
Status: CANCELLED | OUTPATIENT
Start: 2022-08-24 | End: 2022-08-24

## 2022-08-24 RX ORDER — LIDOCAINE HYDROCHLORIDE 20 MG/ML
JELLY TOPICAL ONCE
Status: CANCELLED | OUTPATIENT
Start: 2022-08-24 | End: 2022-08-24

## 2022-08-24 RX ORDER — CLOBETASOL PROPIONATE 0.5 MG/G
OINTMENT TOPICAL ONCE
Status: CANCELLED | OUTPATIENT
Start: 2022-08-24 | End: 2022-08-24

## 2022-08-24 RX ORDER — BACITRACIN 500 [USP'U]/G
OINTMENT TOPICAL ONCE
Status: CANCELLED | OUTPATIENT
Start: 2022-08-24 | End: 2022-08-24

## 2022-08-24 RX ORDER — BACITRACIN ZINC AND POLYMYXIN B SULFATE 500; 1000 [USP'U]/G; [USP'U]/G
OINTMENT TOPICAL ONCE
Status: CANCELLED | OUTPATIENT
Start: 2022-08-24 | End: 2022-08-24

## 2022-08-24 RX ORDER — LIDOCAINE HYDROCHLORIDE 40 MG/ML
SOLUTION TOPICAL ONCE
Status: CANCELLED | OUTPATIENT
Start: 2022-08-24 | End: 2022-08-24

## 2022-08-24 RX ORDER — TRIAMCINOLONE ACETONIDE 1 MG/G
OINTMENT TOPICAL ONCE
Status: CANCELLED | OUTPATIENT
Start: 2022-08-24 | End: 2022-08-24

## 2022-08-24 NOTE — WOUND CARE
08/24/22 1415 08/24/22 1418   Wound Leg lower Left;Lateral;Proximal #2 08/17/22   Date First Assessed/Time First Assessed: 08/17/22 1507   Present on Hospital Admission: Yes  Wound Approximate Age at First Assessment (Weeks): 1 weeks  Primary Wound Type: Venous Ulcer  Location: Leg lower  Wound Location Orientation: Left;Lateral;Pr. .. Wound Image   --    Wound Etiology Venous  --    Dressing Status Old drainage noted  --    Cleansed Cleansed with saline  --    Wound Length (cm) 0.5 cm  --    Wound Width (cm) 0.5 cm  --    Wound Depth (cm) 0.1 cm  --    Wound Surface Area (cm^2) 0.25 cm^2  --    Change in Wound Size % -316.67  --    Wound Volume (cm^3) 0.025 cm^3  --    Wound Healing % -317  --    Wound Assessment Pink/red  --    Drainage Amount Small  --    Drainage Description Serous  --    Wound Odor None  --    Sarah-Wound/Incision Assessment Intact  --    Edges Undefined edges  --    Wound Thickness Description Partial thickness  --    Wound Leg lower Left;Lateral;Distal 07/27/22   Date First Assessed/Time First Assessed: 07/27/22 1345   Wound Approximate Age at First Assessment (Weeks): 4 weeks  Primary Wound Type: Vascular  Location: Leg lower  Wound Location Orientation: Left;Lateral;Distal  Date of First Observation: 07/27/22   Wound Image   --    Wound Etiology Venous  --    Dressing Status Old drainage noted  --    Cleansed Cleansed with saline  --    Wound Length (cm) 0.1 cm 0.1 cm   Wound Width (cm) 0.1 cm 0.1 cm   Wound Depth (cm) 0.1 cm 0.1 cm   Wound Surface Area (cm^2) 0.01 cm^2 0.01 cm^2   Change in Wound Size % 99.81 99.81   Wound Volume (cm^3) 0.001 cm^3 0. 001 cm^3   Wound Healing % 100 100   Wound Assessment Epithelialization  --    Drainage Amount None  --    Wound Odor None  --    Sarah-Wound/Incision Assessment Intact  --    Visit Vitals  /80 (BP 1 Location: Left upper arm, BP Patient Position: At rest)   Temp 97.9 °F (36.6 °C)   Resp 18     HR 94

## 2022-08-24 NOTE — PROGRESS NOTES
HISTORY OF PRESENT ILLNESS:  The patient is a 43-year-old man who is referred to wound care center regarding a nonhealing wound on the left lower leg. The patient was first seen at the [address] [place] [address] on 7/27/2022. The patient reports the wound has been present for about a month. He does report a chronic history of swelling of the legs, with left leg being larger. He has had previous ulceration on the left leg. The patient does not have any specific history of blood clot in the left leg. The patient does not have history of diabetes mellitus. He does not report anginal symptoms and does not have history of MI or coronary intervention. He is ambulatory. He reports some dyspnea with exertion, but says he could walk 100 yards without stopping. The patient sleeps lying in bed. He does not sleep sitting in a chair. The patient's past medical history includes hypertension and sleep apnea. He tried but was not successful using CPAP. The patient's medications include furosemide 20 mg per day. He reports this does produce a diuresis. He reports he had been drinking about a gallon of water per day. He has reduced intake. I spoke with primary NP Annemarie Carolina about edema on 8/5/2022. She asked that lasix be increased to 40 mg bid. [facility] staff informed patient. Patient to contact primary office about lab follow up around 9/1/2022. Patient started new lasix 40 mg bid dosing on 8/17/2022. The patient had venous US of lower extremities at [facility] on 7/29/2022: In the right lower extremity, there was no evidence of DVT. No venous insufficiency in deep or superficial systems. .     In the left lower extremity, there was no DVT. Reflux noted only in left SSV and a tributary from it in mid calf. The SFJ could not be identified.       Dressing as of 8/3/2022:  Apply Xeroform over ulcer, apply two-layer compression wrap with calamine from base of toes to upper calf at 0.50 stretch. Dressing as of 8/17/2022:  Apply Acticoat over ulcer, apply two-layer compression wrap with calamine from base of toes to upper calf at 0.50 stretch. PHYSICAL EXAMINATION:     GENERAL:  The patient is an alert, overweight man in no acute distress. WOUND EXAMINATION:  Examination of the right lower extremity revealed 2+ dorsalis pedis pulse. There was 1+ pitting edema from the knee downward on the right. There was a suggestion of some dilated veins in the right lower leg. No ulcerations present in the right lower leg. Examination of the left lower extremity revealed 2+ dorsalis pedis pulse. There was 1+ pitting edema from the knee distally on the left. There is ulcer on the lateral distal aspect of the left lower leg which is 0.1 x 0.1 x 0.1x 0.1 cm in dimension with scabbing on its base. New more proximal ulcer 0.5 x 0.5 x 0.1 cm. Leg edema reduced since he started his higher diuretic dose. Ulcer improved. As he has no venous reflux on the right  and does have pitting edema, I suspect he has systemic source for edema. Mild venous reflux on left is additive. The patient instructed to take lasix 40 mg twice daily. I would recommend that he drink moderate amounts of fluid with meals and little fluid between meals. I also discussed with the patient the need to avoid salt intake in his diet. Dressing ordered:  Apply Acticoat over ulcer, apply two-layer compression wrap with calamine from base of toes to upper calf at 0.50 stretch. The patient will need to use a cast cover for showering. Order Farrow wrap. The patient will follow up in wound care center in 1 week. FINAL DIAGNOSIS:  Nonpressure ulcer left lower leg with fat layer exposed, bilateral leg edema, left greater than right, untreated sleep apnea.      L97.922, R60.0, G47.33        Lilibeth Coker MD

## 2022-08-24 NOTE — WOUND CARE
08/24/22 1418   Wound Leg lower Left;Lateral;Proximal #2 08/17/22   Date First Assessed/Time First Assessed: 08/17/22 1507   Present on Hospital Admission: Yes  Wound Approximate Age at First Assessment (Weeks): 1 weeks  Primary Wound Type: Venous Ulcer  Location: Leg lower  Wound Location Orientation: Left;Lateral;Pr. .. Dressing/Treatment ABD pad  (acticoat, ABD, 2 layer calamine wrap)   Wound Leg lower Left;Lateral;Distal 07/27/22   Date First Assessed/Time First Assessed: 07/27/22 1345   Wound Approximate Age at First Assessment (Weeks): 4 weeks  Primary Wound Type: Vascular  Location: Leg lower  Wound Location Orientation: Left;Lateral;Distal  Date of First Observation: 07/27/22   Dressing/Treatment ABD pad  (acticoat, ABD 2 layer calamine wrap)   Multilayer Compression Wrap   (Not Unna) Below the Knee    NAME:  Nida Betts  YOB: 1950  MEDICAL RECORD NUMBER:  [id number]  DATE:  8/24/2022    Removed old Multilayer wrap if indicated and wash leg with mild soap/water. Applied moisturizing agent to dry skin as needed. Applied primary and secondary dressing as ordered. Applied multilayered dressing below the knee to left lower leg. Instructed patient/caregiver not to remove dressing and to keep it clean and dry. Instructed patient/caregiver on complications to report to provider, such as pain, numbness in toes, heavy drainage, and slippage of dressing. Instructed patient on purpose of compression dressing and on activity and exercise recommendations.       Electronically signed by Julia Lizarraga RN on 8/24/2022 at 2:49 PM

## 2022-08-24 NOTE — DISCHARGE INSTRUCTIONS
Discharge Instructions [address], [address] [place] [address] Laya Starkey, [id number]  Telephone: [phone]    NAME:  Shannon Rendon  YOB: 1950  MEDICAL RECORD NUMBER:  [id number]  DATE:  8/24/2022  WOUND CARE ORDERS:  Left lower leg wounds :Cleanse with saline , apply primary dressing Acticoat Flex 7 cover with secondary dressing   abd pad . Apply 2 layers  Pt./pcg/HH nurse to change (freq) once a week in clinic and as needed for compromise. F/U in 1 week. TREATMENT ORDERS:    Elevate leg(s) above the level of the heart when sitting. Avoid prolonged standing in one place. Do no get dressing/wrap wet. Follow Diet as prescribed:   [] Diet as tolerated: [] Calorie Diabetic Diet: Low carb and no Sugar [] No Added Salt:  [] Increase Protein: [] Limit the amount of liquid you are drinking and avoid drinking in between meals           Return Appointment:  [x] Return Appointment: With DR Sarah Freeman  in  [address] [place] [address])  [] Nurse Visit : *** days  [] Ordered tests:    Electronically signed Romaine Valles RN on 8/24/2022 at 2:39 PM     [address] Information: Should you experience any significant changes in your wound(s) or have questions about your wound care, please contact the [address] [place] [address] at [address] [place] [address] Street 8:00 am - 4:30. If you need help with your wound outside these hours and cannot wait until we are again available, contact your PCP or go to the hospital emergency room. PLEASE NOTE: IF YOU ARE UNABLE TO OBTAIN WOUND SUPPLIES, CONTINUE TO USE THE SUPPLIES YOU HAVE AVAILABLE UNTIL YOU ARE ABLE TO REACH US. IT IS MOST IMPORTANT TO KEEP THE WOUND COVERED AT ALL TIMES. Physician Signature:_______________________    Date: ___________ Time:  ____________ Try to reduce how much fluid you take in. Felipe Orr

## 2022-08-31 ENCOUNTER — HOSPITAL ENCOUNTER (OUTPATIENT)
Dept: WOUND CARE | Age: 72
Discharge: HOME | End: 2022-08-31
Payer: MEDICARE

## 2022-08-31 VITALS
HEART RATE: 98 BPM | DIASTOLIC BLOOD PRESSURE: 56 MMHG | RESPIRATION RATE: 18 BRPM | SYSTOLIC BLOOD PRESSURE: 99 MMHG | TEMPERATURE: 97.7 F

## 2022-08-31 DIAGNOSIS — R60.0 BILATERAL LEG EDEMA: ICD-10-CM

## 2022-08-31 DIAGNOSIS — L97.922 NON-PRESSURE CHRONIC ULCER OF LEFT LOWER LEG WITH FAT LAYER EXPOSED (HCC): Primary | ICD-10-CM

## 2022-08-31 PROCEDURE — 99213 OFFICE O/P EST LOW 20 MIN: CPT | Performed by: SURGERY

## 2022-08-31 PROCEDURE — 29581 APPL MULTLAYER CMPRN SYS LEG: CPT

## 2022-08-31 RX ORDER — BACITRACIN ZINC AND POLYMYXIN B SULFATE 500; 1000 [USP'U]/G; [USP'U]/G
OINTMENT TOPICAL ONCE
Status: CANCELLED | OUTPATIENT
Start: 2022-08-31 | End: 2022-08-31

## 2022-08-31 RX ORDER — LIDOCAINE HYDROCHLORIDE 20 MG/ML
JELLY TOPICAL ONCE
Status: CANCELLED | OUTPATIENT
Start: 2022-08-31 | End: 2022-08-31

## 2022-08-31 RX ORDER — TRIAMCINOLONE ACETONIDE 1 MG/G
OINTMENT TOPICAL ONCE
Status: CANCELLED | OUTPATIENT
Start: 2022-08-31 | End: 2022-08-31

## 2022-08-31 RX ORDER — LIDOCAINE HYDROCHLORIDE 40 MG/ML
SOLUTION TOPICAL ONCE
Status: CANCELLED | OUTPATIENT
Start: 2022-08-31 | End: 2022-08-31

## 2022-08-31 RX ORDER — BACITRACIN 500 [USP'U]/G
OINTMENT TOPICAL ONCE
Status: CANCELLED | OUTPATIENT
Start: 2022-08-31 | End: 2022-08-31

## 2022-08-31 RX ORDER — BETAMETHASONE DIPROPIONATE 0.5 MG/G
OINTMENT TOPICAL ONCE
Status: CANCELLED | OUTPATIENT
Start: 2022-08-31 | End: 2022-08-31

## 2022-08-31 RX ORDER — GENTAMICIN SULFATE 1 MG/G
OINTMENT TOPICAL ONCE
Status: CANCELLED | OUTPATIENT
Start: 2022-08-31 | End: 2022-08-31

## 2022-08-31 RX ORDER — SILVER SULFADIAZINE 10 G/1000G
CREAM TOPICAL ONCE
Status: CANCELLED | OUTPATIENT
Start: 2022-08-31 | End: 2022-08-31

## 2022-08-31 RX ORDER — MUPIROCIN 20 MG/G
OINTMENT TOPICAL ONCE
Status: CANCELLED | OUTPATIENT
Start: 2022-08-31 | End: 2022-08-31

## 2022-08-31 RX ORDER — LIDOCAINE 40 MG/G
CREAM TOPICAL ONCE
Status: CANCELLED | OUTPATIENT
Start: 2022-08-31 | End: 2022-08-31

## 2022-08-31 RX ORDER — CLOBETASOL PROPIONATE 0.5 MG/G
OINTMENT TOPICAL ONCE
Status: CANCELLED | OUTPATIENT
Start: 2022-08-31 | End: 2022-08-31

## 2022-08-31 NOTE — PROGRESS NOTES
HISTORY OF PRESENT ILLNESS:  The patient is a 29-year-old man who is referred to wound care center regarding a nonhealing wound on the left lower leg. The patient was first seen at the [address] [place] [address] on 7/27/2022. The patient reports the wound has been present for about a month. He does report a chronic history of swelling of the legs, with left leg being larger. He has had previous ulceration on the left leg. The patient does not have any specific history of blood clot in the left leg. The patient does not have history of diabetes mellitus. He does not report anginal symptoms and does not have history of MI or coronary intervention. He is ambulatory. He reports some dyspnea with exertion, but says he could walk 100 yards without stopping. The patient sleeps lying in bed. He does not sleep sitting in a chair. The patient's past medical history includes hypertension and sleep apnea. He tried but was not successful using CPAP. The patient's medications include furosemide 20 mg per day. He reports this does produce a diuresis. He reports he had been drinking about a gallon of water per day. He has reduced intake. I spoke with primary NP Stephan Preston about edema on 8/5/2022. She asked that lasix be increased to 40 mg bid. [address] [place] [address],[address] staff informed patient. Patient to contact primary office about lab follow up around 9/1/2022. Patient started new lasix 40 mg bid dosing on 8/17/2022. The patient had venous US of lower extremities at [facility] on 7/29/2022: In the right lower extremity, there was no evidence of DVT. No venous insufficiency in deep or superficial systems. .     In the left lower extremity, there was no DVT. Reflux noted only in left SSV and a tributary from it in mid calf. The SFJ could not be identified.       Dressing as of 8/3/2022:  Apply Xeroform over ulcer, apply two-layer compression wrap with calamine from base of toes to upper calf at 0.50 stretch. Dressing as of 8/17/2022:  Apply Acticoat over ulcer, apply two-layer compression wrap with calamine from base of toes to upper calf at 0.50 stretch. PHYSICAL EXAMINATION:     GENERAL:  The patient is an alert, overweight man in no acute distress. WOUND EXAMINATION:  Examination of the right lower extremity revealed 2+ dorsalis pedis pulse. There was 1+ pitting edema from the knee downward on the right. There was a suggestion of some dilated veins in the right lower leg. No ulcerations present in the right lower leg. Examination of the left lower extremity revealed 2+ dorsalis pedis pulse. There was 1+ pitting edema from the knee distally on the left. Site on the lateral distal aspect of the left lower leg is healed. More proximal ulcer 0.4 x 0.7 x 0.1 cm. Leg edema reduced since he started his higher diuretic dose. Ulcer improved. As he has no venous reflux on the right  and does have pitting edema, I suspect he has systemic source for edema. Mild venous reflux on left is additive. The patient instructed to take lasix 40 mg twice daily. I would recommend that he drink moderate amounts of fluid with meals and little fluid between meals. I also discussed with the patient the need to avoid salt intake in his diet. Dressing ordered:  Apply Acticoat over ulcer, apply two-layer compression wrap with calamine from base of toes to upper calf at 0.50 stretch. The patient will need to use a cast cover for showering. Has Farrow wrap. The patient will follow up in wound care center in 1 week. Bring Farrow wrap. FINAL DIAGNOSIS:  Nonpressure ulcer left lower leg with fat layer exposed, bilateral leg edema, left greater than right, untreated sleep apnea.      L97.922, R60.0, G47.33        Jordan Torres MD

## 2022-08-31 NOTE — DISCHARGE INSTRUCTIONS
Discharge Instructions/Wound Orders  [address], [address] [place] [address] Laya Starkey, [id number]  Telephone: [phone]    NAME:  Gerson Georges  YOB: 1950  MEDICAL RECORD NUMBER:  [id number]  DATE:  08/31/22  Wound Care Orders:  Left lower leg wound :Cleanse with Soap and water , apply primary dressing Acticoat Flex 7 cover with secondary dressing   abd pad . Apply 2 layers with Calamine  Pt./pcg/HH nurse to change (freq) once a week in clinic and as needed for compromise. F/U in 1 week. Bring farrow wrap with you to your next appointment   Dietary:  [] Diet as tolerated: [] Diabetic Diet:Low carb and no Sugar   [] No Added Salt:[] Increase Protein:   [x] Other:Limit the amount of liquid you are drinking and avoid drinking in between meals   Activity:  [] Activity as tolerated:     Return Appointment:  [x] Return Appointment: With DR Niko Nixon  in  [facility])  [] Ordered tests:   Electronically signed Carrie Amos RN on 8/31/2022 at 2:18 PM   Kamilla Lazar 281: Should you experience any significant changes in your wound(s) or have questions about your wound care, please contact the [address] [place] [address] at [address] [place] [address] 8:00 am - 4:30. If you need help with your wound outside these hours and cannot wait until we are again available, contact your PCP or go to the hospital emergency room. PLEASE NOTE: IF YOU ARE UNABLE TO OBTAIN WOUND SUPPLIES, CONTINUE TO USE THE SUPPLIES YOU HAVE AVAILABLE UNTIL YOU ARE ABLE TO REACH US. IT IS MOST IMPORTANT TO KEEP THE WOUND COVERED AT ALL TIMES.     Physician Signature:_______________________    Date: ___________ Time:  ____________

## 2022-08-31 NOTE — WOUND CARE
08/31/22 1404   Left Leg Edema Point of Measurement   Leg circumference 43.5 cm   Ankle circumference 27 cm   Compression Therapy 2 layer compression wrap   LLE Peripheral Vascular    Capillary Refill Less than/equal to 3 seconds   Color Appropriate for race   Temperature Warm   Sensation Present   Pedal Pulse Palpable   Wound Leg lower Left;Lateral;Proximal #2 08/17/22   Date First Assessed/Time First Assessed: 08/17/22 1507   Present on Hospital Admission: Yes  Wound Approximate Age at First Assessment (Weeks): 1 weeks  Primary Wound Type: Venous Ulcer  Location: Leg lower  Wound Location Orientation: Left;Lateral;Pr. ..    Wound Image    Wound Etiology Venous   Dressing Status Old drainage noted   Cleansed Cleansed with saline   Dressing/Treatment ABD pad;Silver dressing  (2 layer with calamine)   Wound Length (cm) 0.4 cm   Wound Width (cm) 0.7 cm   Wound Depth (cm) 0.1 cm   Wound Surface Area (cm^2) 0.28 cm^2   Change in Wound Size % -366.67   Wound Volume (cm^3) 0.028 cm^3   Wound Healing % -367   Wound Assessment Pink/red   Drainage Amount Scant   Drainage Description Serous   Wound Odor None   Sarah-Wound/Incision Assessment Intact   Edges Undefined edges   Wound Thickness Description Partial thickness   Pain 1   Pain Scale 1 Numeric (0 - 10)   Pain Intensity 1 0       Visit Vitals  BP (!) 99/56   Pulse 98   Temp 97.7 °F (36.5 °C)   Resp 18

## 2022-08-31 NOTE — WOUND CARE
08/31/22 1420   Left Leg Edema Point of Measurement   Compression Therapy 2 layer compression wrap   Wound Leg lower Left;Lateral;Proximal #2 08/17/22   Date First Assessed/Time First Assessed: 08/17/22 1507   Present on Hospital Admission: Yes  Wound Approximate Age at First Assessment (Weeks): 1 weeks  Primary Wound Type: Venous Ulcer  Location: Leg lower  Wound Location Orientation: Left;Lateral;Pr. .. Dressing Status New dressing applied   Dressing/Treatment   (acticoat, ABD and 2 layer with calamine)     Multilayer Compression Wrap   (Not Unna) Below the Knee    NAME:  Gerson Georges  YOB: 1950  MEDICAL RECORD NUMBER:  [id number]  DATE:  8/31/2022    Removed old Multilayer wrap if indicated and wash leg with mild soap/water. Applied moisturizing agent to dry skin as needed. Applied primary and secondary dressing as ordered. Applied multilayered dressing below the knee to left lower leg. Instructed patient/caregiver not to remove dressing and to keep it clean and dry. Instructed patient/caregiver on complications to report to provider, such as pain, numbness in toes, heavy drainage, and slippage of dressing. Instructed patient on purpose of compression dressing and on activity and exercise recommendations.     Response to treatment: Well tolerated by patient       Electronically signed by Carrie Amos RN on 8/31/2022 at 2:21 PM

## 2022-09-07 ENCOUNTER — HOSPITAL ENCOUNTER (OUTPATIENT)
Dept: WOUND CARE | Age: 72
Discharge: HOME | End: 2022-09-07
Payer: MEDICARE

## 2022-09-07 VITALS
HEART RATE: 86 BPM | SYSTOLIC BLOOD PRESSURE: 149 MMHG | RESPIRATION RATE: 18 BRPM | DIASTOLIC BLOOD PRESSURE: 89 MMHG | TEMPERATURE: 98.2 F

## 2022-09-07 DIAGNOSIS — L97.922 NON-PRESSURE CHRONIC ULCER OF LEFT LOWER LEG WITH FAT LAYER EXPOSED (HCC): Primary | ICD-10-CM

## 2022-09-07 DIAGNOSIS — R60.0 BILATERAL LEG EDEMA: ICD-10-CM

## 2022-09-07 PROCEDURE — 99212 OFFICE O/P EST SF 10 MIN: CPT

## 2022-09-07 PROCEDURE — 99213 OFFICE O/P EST LOW 20 MIN: CPT | Performed by: SURGERY

## 2022-09-07 NOTE — WOUND CARE
09/07/22 1443   Wound Leg lower Left;Lateral;Proximal #2 08/17/22   Date First Assessed/Time First Assessed: 08/17/22 1507   Present on Hospital Admission: Yes  Wound Approximate Age at First Assessment (Weeks): 1 weeks  Primary Wound Type: Venous Ulcer  Location: Leg lower  Wound Location Orientation: Left;Lateral;Pr. ..    Wound Image    Wound Etiology Venous   Cleansed Soap and water   Wound Length (cm) 0.1 cm   Wound Width (cm) 0.1 cm   Wound Depth (cm) 0.1 cm   Wound Surface Area (cm^2) 0.01 cm^2   Change in Wound Size % 83.33   Wound Volume (cm^3) 0.001 cm^3   Wound Healing % 83   Wound Assessment Epithelialization   Drainage Amount None   Wound Odor None   Sarah-Wound/Incision Assessment Intact   Visit Vitals  BP (!) 149/89 (BP 1 Location: Left upper arm, BP Patient Position: At rest)   Pulse 86   Temp 98.2 °F (36.8 °C)   Resp 18

## 2022-09-07 NOTE — PROGRESS NOTES
HISTORY OF PRESENT ILLNESS:  The patient is a 21-year-old man who is referred to wound care center regarding a nonhealing wound on the left lower leg. The patient was first seen at the [address] [place] [address] on 7/27/2022. The patient reports the wound has been present for about a month. He does report a chronic history of swelling of the legs, with left leg being larger. He has had previous ulceration on the left leg. The patient does not have any specific history of blood clot in the left leg. The patient does not have history of diabetes mellitus. He does not report anginal symptoms and does not have history of MI or coronary intervention. He is ambulatory. He reports some dyspnea with exertion, but says he could walk 100 yards without stopping. The patient sleeps lying in bed. He does not sleep sitting in a chair. The patient's past medical history includes hypertension and sleep apnea. He tried but was not successful using CPAP. The patient's medications include furosemide 20 mg per day. He reports this does produce a diuresis. He reports he had been drinking about a gallon of water per day. He has reduced intake. I spoke with primary NP Annemarie Carolina about edema on 8/5/2022. She asked that lasix be increased to 40 mg bid. [facility] staff informed patient. Patient to contact primary office about lab follow up around 9/1/2022. Patient started new lasix 40 mg bid dosing on 8/17/2022. The patient had venous US of lower extremities at [facility] on 7/29/2022: In the right lower extremity, there was no evidence of DVT. No venous insufficiency in deep or superficial systems. .     In the left lower extremity, there was no DVT. Reflux noted only in left SSV and a tributary from it in mid calf. The SFJ could not be identified.       Dressing as of 8/3/2022:  Apply Xeroform over ulcer, apply two-layer compression wrap with calamine from base of toes to upper calf at 0.50 stretch. Dressing as of 8/17/2022:  Apply Acticoat over ulcer, apply two-layer compression wrap with calamine from base of toes to upper calf at 0.50 stretch. PHYSICAL EXAMINATION:     GENERAL:  The patient is an alert, overweight man in no acute distress. WOUND EXAMINATION:  Examination of the right lower extremity revealed 2+ dorsalis pedis pulse. There was 1+ pitting edema from the knee downward on the right. No ulcerations present in the right lower leg. Examination of the left lower extremity revealed 2+ dorsalis pedis pulse. There was trace to 1+ pitting edema from the knee distally on the left. Site on the lateral distal aspect of the left lower leg is healed. More proximal ulcer is healed. Leg edema reduced but not eliminated since he started his higher diuretic dose. Ulcers all healed. As he has no venous reflux on the right  and does have pitting edema, I suspect he has systemic source for edema. Mild venous reflux on left is additive. The patient instructed to take lasix 40 mg twice daily. I would recommend that he drink moderate amounts of fluid with meals and little fluid between meals. I also discussed with the patient the need to avoid salt intake in his diet. Patient instructed in use of Farrow warp. He will need Martinez Bridges in place on left foot and leg at all  times when out of bed. Patient should discuss with primary NP potential for a higher dose of diuretic, as he still has significant edema at cutrrent dosing. No follow up appointment needed. FINAL DIAGNOSIS:  Nonpressure ulcer left lower leg with fat layer exposed (healed), bilateral leg edema, left greater than right, untreated sleep apnea.      L97.922, R60.0, G47.33        Angie Bearden MD

## 2022-09-07 NOTE — DISCHARGE INSTRUCTIONS
Discharge Instructions [address], [address] [place] [address] Laya Starkey [id number]  Telephone: [phone]    NAME:  Yeny Downing  YOB: 1950  MEDICAL RECORD NUMBER:  [id number]  DATE:  9/7/2022  WOUND CARE ORDERS:  Wounds have healed. .. Use your farrow wrap . Linh Wang use it all day every day. .forever, to keep legs from swelling. . put on as soon as you get up in the morning, until you go to bed at night. .. you do not have to sleep in it. .      TREATMENT ORDERS:    Elevate leg(s) above the level of the heart when sitting. Avoid prolonged standing in one place. Do no get dressing/wrap wet. Follow Diet as prescribed:   [] Diet as tolerated: [] Calorie Diabetic Diet: Low carb and no Sugar   [] No Added Salt:  [] Increase Protein:   [] Limit the amount of liquid you are drinking and avoid drinking in between meals     Continue your fluid pills. .. 2 of the 20 mg tabs, twice a day        Return Appointment:  [x] Return Appointment: no follow up appointment needed at wound center  []   [] Ordered tests:    Electronically signed Annie Moore RN on 9/7/2022 at 2:58 PM     Kamilla Lazar 281: Should you experience any significant changes in your wound(s) or have questions about your wound care, please contact the [address] [place] [address] at [address] [place] [address] 8:00 am - 4:30. If you need help with your wound outside these hours and cannot wait until we are again available, contact your PCP or go to the hospital emergency room. PLEASE NOTE: IF YOU ARE UNABLE TO OBTAIN WOUND SUPPLIES, CONTINUE TO USE THE SUPPLIES YOU HAVE AVAILABLE UNTIL YOU ARE ABLE TO REACH US. IT IS MOST IMPORTANT TO KEEP THE WOUND COVERED AT ALL TIMES.      Physician Signature:_______________________    Date: ___________ Time:  ____________

## 2022-09-15 ENCOUNTER — TELEPHONE (OUTPATIENT)
Dept: WOUND CARE | Age: 72
End: 2022-09-15

## 2022-09-15 NOTE — TELEPHONE ENCOUNTER
Received phone call from Reema at patients PCP office to confirm dose of Furosemide that patient should be taking. She reports that Gerard Flores NP had patient increase Furosemide to 20mg 1 tab 2 x per day. However, patient reported taking 20mg 2tabs 2 x per day. She wanted confirmation from Dr. Eric Barney regarding dose. After phone call to Dr. Eric Barney in which he stated that dose should be whatever PCP wants him to have, Reema was notified that patient should follow dosing recommendation of Ms Antonina Meyers NP at PCP office.

## 2024-08-06 NOTE — PERIOP NOTE
[facility]  Ambulatory Surgery Unit  Pre-operative Instructions    Surgery/Procedure Date  Friday 8/16            Tentative Arrival Time TBD      1. On the day of your surgery/procedure, please report to the Ambulatory Surgery Unit Registration Desk and sign in at your designated time. The Ambulatory Surgery Unit is located in [facility] on the [facility] of the hospitals across from the [facility]. Please have all of your health insurance cards, co-payment, and a photo ID.    **TWO adults may accompany you the day of the procedure.  We have limited seating available.      2. You cannot be dropped off for surgery.  Please make arrangements for a responsible adult friend or family member to remain on the hospital campus during your procedure, and drive you home, as you should not drive for 24 hours following anesthesia. Make arrangements for a responsible adult to stay with you for at least the first 24 hours after your surgery.    3. Do not have anything to eat or drink (including water, gum, mints, coffee, juice) after 11:59 PM on Thursday 8/15. This may not apply to medications prescribed by your physician.  (Please note below the special instructions with medications to take the morning of surgery, if applicable.)    4. We recommend you do not drink any alcoholic beverages for 24 hours before and after your surgery.    5. Contact your surgeon’s office for instructions on the following medications: non-steroidal anti-inflammatory drugs (i.e. Advil, Aleve), vitamins, and supplements. (Some surgeon’s will want you to stop these medications prior to surgery and others may allow you to take them)   **If you are currently taking Plavix, Coumadin, Aspirin and/or other blood-thinning agents, contact your surgeon for instructions.** Your surgeon will partner with the physician prescribing these medications to determine if it is safe to stop or if you need to continue taking. Please

## 2024-08-15 ENCOUNTER — ANESTHESIA EVENT (OUTPATIENT)
Facility: HOSPITAL | Age: 74
End: 2024-08-15
Payer: MEDICARE

## 2024-08-16 ENCOUNTER — ANESTHESIA (OUTPATIENT)
Facility: HOSPITAL | Age: 74
End: 2024-08-16
Payer: MEDICARE

## 2024-08-16 ENCOUNTER — HOSPITAL ENCOUNTER (OUTPATIENT)
Facility: HOSPITAL | Age: 74
Setting detail: OUTPATIENT SURGERY
Discharge: HOME | End: 2024-08-16
Attending: OPHTHALMOLOGY | Admitting: OPHTHALMOLOGY
Payer: MEDICARE

## 2024-08-16 VITALS
RESPIRATION RATE: 18 BRPM | TEMPERATURE: 97.4 F | OXYGEN SATURATION: 94 % | HEIGHT: 65 IN | WEIGHT: 239 LBS | DIASTOLIC BLOOD PRESSURE: 69 MMHG | SYSTOLIC BLOOD PRESSURE: 152 MMHG | BODY MASS INDEX: 39.82 KG/M2 | HEART RATE: 80 BPM

## 2024-08-16 PROCEDURE — 6370000000 HC RX 637 (ALT 250 FOR IP)

## 2024-08-16 PROCEDURE — 6360000002 HC RX W HCPCS: Performed by: NURSE ANESTHETIST, CERTIFIED REGISTERED

## 2024-08-16 PROCEDURE — 6370000000 HC RX 637 (ALT 250 FOR IP): Performed by: OPHTHALMOLOGY

## 2024-08-16 PROCEDURE — 7100000010 HC PHASE II RECOVERY - FIRST 15 MIN: Performed by: OPHTHALMOLOGY

## 2024-08-16 PROCEDURE — 7100000000 HC PACU RECOVERY - FIRST 15 MIN: Performed by: OPHTHALMOLOGY

## 2024-08-16 PROCEDURE — 2709999900 HC NON-CHARGEABLE SUPPLY: Performed by: OPHTHALMOLOGY

## 2024-08-16 PROCEDURE — 3600000012 HC SURGERY LEVEL 2 ADDTL 15MIN: Performed by: OPHTHALMOLOGY

## 2024-08-16 PROCEDURE — 2500000003 HC RX 250 WO HCPCS

## 2024-08-16 PROCEDURE — 7100000001 HC PACU RECOVERY - ADDTL 15 MIN: Performed by: OPHTHALMOLOGY

## 2024-08-16 PROCEDURE — 3700000000 HC ANESTHESIA ATTENDED CARE: Performed by: OPHTHALMOLOGY

## 2024-08-16 PROCEDURE — 2500000003 HC RX 250 WO HCPCS: Performed by: OPHTHALMOLOGY

## 2024-08-16 PROCEDURE — 3700000001 HC ADD 15 MINUTES (ANESTHESIA): Performed by: OPHTHALMOLOGY

## 2024-08-16 PROCEDURE — V2632 POST CHMBR INTRAOCULAR LENS: HCPCS | Performed by: OPHTHALMOLOGY

## 2024-08-16 PROCEDURE — 3600000002 HC SURGERY LEVEL 2 BASE: Performed by: OPHTHALMOLOGY

## 2024-08-16 PROCEDURE — C1783 OCULAR IMP, AQUEOUS DRAIN DE: HCPCS | Performed by: OPHTHALMOLOGY

## 2024-08-16 PROCEDURE — 6360000002 HC RX W HCPCS: Performed by: OPHTHALMOLOGY

## 2024-08-16 PROCEDURE — 2580000003 HC RX 258: Performed by: ANESTHESIOLOGY

## 2024-08-16 PROCEDURE — 2500000003 HC RX 250 WO HCPCS: Performed by: NURSE ANESTHETIST, CERTIFIED REGISTERED

## 2024-08-16 DEVICE — TRABECULAR MICRO-BYPASS STENT SYSTEM - LEFT
Type: IMPLANTABLE DEVICE | Site: EYE | Status: FUNCTIONAL
Brand: ISTENT

## 2024-08-16 DEVICE — ACRYSOF(R) IQ ASPHERIC NATURAL IOL, SINGLE-PIECE ACRYLIC FOLDABLE PCL, UV WITH BLUE LIGHTFILTER, 13.0MM LENGTH, 6.0MM ANTERIORASYMMETRIC BICONVEX OPTIC, PLANAR HAPTICS.
Type: IMPLANTABLE DEVICE | Site: EYE | Status: FUNCTIONAL
Brand: ACRYSOF®

## 2024-08-16 RX ORDER — ONDANSETRON 2 MG/ML
4 INJECTION INTRAMUSCULAR; INTRAVENOUS
Status: DISCONTINUED | OUTPATIENT
Start: 2024-08-16 | End: 2024-08-17 | Stop reason: HOSPADM

## 2024-08-16 RX ORDER — TROPICAMIDE 10 MG/ML
SOLUTION/ DROPS OPHTHALMIC
Status: COMPLETED
Start: 2024-08-16 | End: 2024-08-16

## 2024-08-16 RX ORDER — CYCLOPENTOLATE HYDROCHLORIDE 20 MG/ML
SOLUTION/ DROPS OPHTHALMIC
Status: COMPLETED
Start: 2024-08-16 | End: 2024-08-16

## 2024-08-16 RX ORDER — SODIUM CHLORIDE, SODIUM LACTATE, POTASSIUM CHLORIDE, CALCIUM CHLORIDE 600; 310; 30; 20 MG/100ML; MG/100ML; MG/100ML; MG/100ML
INJECTION, SOLUTION INTRAVENOUS CONTINUOUS
Status: DISCONTINUED | OUTPATIENT
Start: 2024-08-16 | End: 2024-08-18 | Stop reason: HOSPADM

## 2024-08-16 RX ORDER — CYCLOPENTOLATE HYDROCHLORIDE 20 MG/ML
1 SOLUTION/ DROPS OPHTHALMIC SEE ADMIN INSTRUCTIONS
Status: COMPLETED | OUTPATIENT
Start: 2024-08-16 | End: 2024-08-16

## 2024-08-16 RX ORDER — TROPICAMIDE 10 MG/ML
1 SOLUTION/ DROPS OPHTHALMIC SEE ADMIN INSTRUCTIONS
Status: DISCONTINUED | OUTPATIENT
Start: 2024-08-16 | End: 2024-08-18 | Stop reason: HOSPADM

## 2024-08-16 RX ORDER — DICLOFENAC SODIUM 1 MG/ML
1 SOLUTION/ DROPS OPHTHALMIC SEE ADMIN INSTRUCTIONS
Status: COMPLETED | OUTPATIENT
Start: 2024-08-16 | End: 2024-08-16

## 2024-08-16 RX ORDER — LIDOCAINE HYDROCHLORIDE 10 MG/ML
1 INJECTION, SOLUTION EPIDURAL; INFILTRATION; INTRACAUDAL; PERINEURAL
Status: DISCONTINUED | OUTPATIENT
Start: 2024-08-16 | End: 2024-08-17 | Stop reason: HOSPADM

## 2024-08-16 RX ORDER — FENTANYL CITRATE 50 UG/ML
25 INJECTION, SOLUTION INTRAMUSCULAR; INTRAVENOUS EVERY 5 MIN PRN
Status: DISCONTINUED | OUTPATIENT
Start: 2024-08-16 | End: 2024-08-18 | Stop reason: HOSPADM

## 2024-08-16 RX ORDER — DICLOFENAC SODIUM 1 MG/ML
SOLUTION/ DROPS OPHTHALMIC
Status: COMPLETED
Start: 2024-08-16 | End: 2024-08-16

## 2024-08-16 RX ORDER — SODIUM CHLORIDE 9 MG/ML
INJECTION, SOLUTION INTRAVENOUS PRN
Status: DISCONTINUED | OUTPATIENT
Start: 2024-08-16 | End: 2024-08-18 | Stop reason: HOSPADM

## 2024-08-16 RX ORDER — PHENYLEPHRINE HYDROCHLORIDE 25 MG/ML
1 SOLUTION/ DROPS OPHTHALMIC SEE ADMIN INSTRUCTIONS
Status: COMPLETED | OUTPATIENT
Start: 2024-08-16 | End: 2024-08-16

## 2024-08-16 RX ORDER — ACETAMINOPHEN 500 MG
1000 TABLET ORAL
Status: DISCONTINUED | OUTPATIENT
Start: 2024-08-16 | End: 2024-08-17 | Stop reason: HOSPADM

## 2024-08-16 RX ORDER — NEOMYCIN SULFATE, POLYMYXIN B SULFATE, AND DEXAMETHASONE 3.5; 10000; 1 MG/G; [USP'U]/G; MG/G
OINTMENT OPHTHALMIC ONCE
Status: COMPLETED | OUTPATIENT
Start: 2024-08-16 | End: 2024-08-16

## 2024-08-16 RX ORDER — KETOROLAC TROMETHAMINE 30 MG/ML
15 INJECTION, SOLUTION INTRAMUSCULAR; INTRAVENOUS
Status: DISCONTINUED | OUTPATIENT
Start: 2024-08-16 | End: 2024-08-17 | Stop reason: HOSPADM

## 2024-08-16 RX ORDER — PHENYLEPHRINE HYDROCHLORIDE 25 MG/ML
SOLUTION/ DROPS OPHTHALMIC
Status: COMPLETED
Start: 2024-08-16 | End: 2024-08-16

## 2024-08-16 RX ORDER — PROPARACAINE HYDROCHLORIDE 5 MG/ML
SOLUTION/ DROPS OPHTHALMIC
Status: COMPLETED
Start: 2024-08-16 | End: 2024-08-16

## 2024-08-16 RX ORDER — DEXAMETHASONE SODIUM PHOSPHATE 4 MG/ML
INJECTION, SOLUTION INTRA-ARTICULAR; INTRALESIONAL; INTRAMUSCULAR; INTRAVENOUS; SOFT TISSUE PRN
Status: DISCONTINUED | OUTPATIENT
Start: 2024-08-16 | End: 2024-08-16 | Stop reason: SDUPTHER

## 2024-08-16 RX ORDER — TETRACAINE HYDROCHLORIDE 5 MG/ML
1 SOLUTION OPHTHALMIC ONCE
Status: DISCONTINUED | OUTPATIENT
Start: 2024-08-16 | End: 2024-08-18 | Stop reason: HOSPADM

## 2024-08-16 RX ORDER — DROPERIDOL 2.5 MG/ML
0.62 INJECTION, SOLUTION INTRAMUSCULAR; INTRAVENOUS
Status: DISCONTINUED | OUTPATIENT
Start: 2024-08-16 | End: 2024-08-17 | Stop reason: HOSPADM

## 2024-08-16 RX ORDER — SODIUM CHLORIDE 0.9 % (FLUSH) 0.9 %
5-40 SYRINGE (ML) INJECTION EVERY 12 HOURS SCHEDULED
Status: DISCONTINUED | OUTPATIENT
Start: 2024-08-16 | End: 2024-08-18 | Stop reason: HOSPADM

## 2024-08-16 RX ORDER — VALSARTAN AND HYDROCHLOROTHIAZIDE 160; 12.5 MG/1; MG/1
1 TABLET, FILM COATED ORAL DAILY
COMMUNITY

## 2024-08-16 RX ORDER — METOPROLOL SUCCINATE 25 MG/1
25 TABLET, EXTENDED RELEASE ORAL DAILY
COMMUNITY
Start: 2024-08-06

## 2024-08-16 RX ORDER — NALOXONE HYDROCHLORIDE 0.4 MG/ML
INJECTION, SOLUTION INTRAMUSCULAR; INTRAVENOUS; SUBCUTANEOUS PRN
Status: DISCONTINUED | OUTPATIENT
Start: 2024-08-16 | End: 2024-08-18 | Stop reason: HOSPADM

## 2024-08-16 RX ORDER — LIDOCAINE HYDROCHLORIDE 20 MG/ML
INJECTION, SOLUTION EPIDURAL; INFILTRATION; INTRACAUDAL; PERINEURAL PRN
Status: DISCONTINUED | OUTPATIENT
Start: 2024-08-16 | End: 2024-08-16 | Stop reason: SDUPTHER

## 2024-08-16 RX ORDER — SODIUM CHLORIDE 0.9 % (FLUSH) 0.9 %
5-40 SYRINGE (ML) INJECTION PRN
Status: DISCONTINUED | OUTPATIENT
Start: 2024-08-16 | End: 2024-08-18 | Stop reason: HOSPADM

## 2024-08-16 RX ORDER — AMOXICILLIN AND CLAVULANATE POTASSIUM 875; 125 MG/1; MG/1
TABLET, FILM COATED ORAL
COMMUNITY
Start: 2024-08-06

## 2024-08-16 RX ORDER — PROPARACAINE HYDROCHLORIDE 5 MG/ML
1 SOLUTION/ DROPS OPHTHALMIC SEE ADMIN INSTRUCTIONS
Status: COMPLETED | OUTPATIENT
Start: 2024-08-16 | End: 2024-08-16

## 2024-08-16 RX ORDER — ATORVASTATIN CALCIUM 20 MG/1
20 TABLET, FILM COATED ORAL DAILY
COMMUNITY

## 2024-08-16 RX ADMIN — PHENYLEPHRINE HYDROCHLORIDE 1 DROP: 25 SOLUTION/ DROPS OPHTHALMIC at 10:12

## 2024-08-16 RX ADMIN — TROPICAMIDE 1 DROP: 10 SOLUTION/ DROPS OPHTHALMIC at 10:05

## 2024-08-16 RX ADMIN — PROPARACAINE HYDROCHLORIDE 1 DROP: 5 SOLUTION/ DROPS OPHTHALMIC at 10:04

## 2024-08-16 RX ADMIN — PROPARACAINE HYDROCHLORIDE 1 DROP: 5 SOLUTION/ DROPS OPHTHALMIC at 10:17

## 2024-08-16 RX ADMIN — CYCLOPENTOLATE HYDROCHLORIDE 1 DROP: 20 SOLUTION/ DROPS OPHTHALMIC at 10:05

## 2024-08-16 RX ADMIN — DICLOFENAC SODIUM 1 DROP: 1 SOLUTION/ DROPS OPHTHALMIC at 10:12

## 2024-08-16 RX ADMIN — PHENYLEPHRINE HYDROCHLORIDE 1 DROP: 25 SOLUTION/ DROPS OPHTHALMIC at 10:17

## 2024-08-16 RX ADMIN — SODIUM CHLORIDE: 9 INJECTION, SOLUTION INTRAVENOUS at 10:13

## 2024-08-16 RX ADMIN — DICLOFENAC SODIUM 1 DROP: 1 SOLUTION/ DROPS OPHTHALMIC at 10:05

## 2024-08-16 RX ADMIN — DICLOFENAC SODIUM 1 DROP: 1 SOLUTION/ DROPS OPHTHALMIC at 10:17

## 2024-08-16 RX ADMIN — PHENYLEPHRINE HYDROCHLORIDE 1 DROP: 25 SOLUTION/ DROPS OPHTHALMIC at 10:05

## 2024-08-16 RX ADMIN — PROPOFOL 50 MG: 10 INJECTION, EMULSION INTRAVENOUS at 12:19

## 2024-08-16 RX ADMIN — LIDOCAINE HYDROCHLORIDE 100 MG: 20 INJECTION, SOLUTION EPIDURAL; INFILTRATION; INTRACAUDAL; PERINEURAL at 12:19

## 2024-08-16 RX ADMIN — DEXAMETHASONE SODIUM PHOSPHATE 10 MG: 4 INJECTION, SOLUTION INTRAMUSCULAR; INTRAVENOUS at 12:56

## 2024-08-16 RX ADMIN — CYCLOPENTOLATE HYDROCHLORIDE 1 DROP: 20 SOLUTION/ DROPS OPHTHALMIC at 10:12

## 2024-08-16 RX ADMIN — SODIUM CHLORIDE: 9 INJECTION, SOLUTION INTRAVENOUS at 12:15

## 2024-08-16 RX ADMIN — CYCLOPENTOLATE HYDROCHLORIDE 1 DROP: 20 SOLUTION/ DROPS OPHTHALMIC at 10:17

## 2024-08-16 ASSESSMENT — PAIN - FUNCTIONAL ASSESSMENT: PAIN_FUNCTIONAL_ASSESSMENT: 0-10

## 2024-08-16 NOTE — ANESTHESIA POSTPROCEDURE EVALUATION
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 eye      Primary open angle glaucoma of left eye, moderate stage      (Combined forms of age-related cataract of left eye [H25.812])      (Primary open angle glaucoma of left eye, moderate stage [H40.1122])    Surgeons: Adeline Ferguson MD Responsible Provider: Jenn Perez MD    Anesthesia Type: MAC ASA Status: 3            Anesthesia Type: MAC    Crystal Phase I: Crystal Score: 10    Crystal Phase II: Crystal Score: 10    Anesthesia Post Evaluation    Patient location during evaluation: PACU  Patient participation: complete - patient participated  Level of consciousness: awake and alert  Pain score: 0  Airway patency: patent  Nausea & Vomiting: no vomiting and no nausea  Cardiovascular status: blood pressure returned to baseline and hemodynamically stable  Respiratory status: acceptable  Hydration status: stable  Comments: Pt appeared Pickwickian in preop, barely able to stay awake for any length of time. He was also snoring very loudly. Pt is morbidly obese, with known KRYSTINA and does not use CPAP. No meds onboard preop.  For procedure, pt only received 50mg of Propofol for peribulbar block placement. He became apneic and 2-handed bag/mask was required to ventilate pt.  It was still difficult to bag with oral airway & nasal trumpet in place. He began to breathe spontaneously after about 10 min, but was not arrousable for at least another 20 -30 min.  He had quite an exaggerated response to Propofol. We had to sit him up to calm down and he finally was back

## 2024-08-16 NOTE — PERIOP NOTE
Pacu Recovery    Patient returned to baseline, vital signs stable (see vital sign flowsheet). Denies pain. Patient offered liquids and tolerated well. Respiratory status within defined limits. Dressing intact. Nursing discussed the patients sleep apnea with him, re educated about the importance of using the cpap and scheduling a visit with his pcp. Responsible party driving patient home was given the opportunity to ask questions, also did education on the importance of using the cpap with the pt's responsible party. Patient discharged with documented belongings.

## 2024-08-16 NOTE — OP NOTE
Operative Note      Patient: Baljinder Zimmerman  YOB: 1950  MRN: [id number]    Date of Procedure: 8/16/2024    Pre-Op Diagnosis Codes:      * Combined forms of age-related cataract of left eye [H25.812]     * Primary open angle glaucoma of left eye, moderate stage [H40.1122]    Post-Op Diagnosis: Same       Procedure(s):  LEFT EYE PHACOEMULSIFICATION WITH INTRAOCULAR LENS IMPLANT WITH I STENT (MAC W/RETROBULBAR)    Surgeon(s):  Adeline Ferguson MD    Assistant:   * No surgical staff found *    Anesthesia: Monitor Anesthesia Care    Estimated Blood Loss (mL): Minimal    Complications: None    Specimens:   * No specimens in log *    Implants:  Implant Name Type Inv. Item Serial No.  Lot No. LRB No. Used Action   Z DUP USE [id number] LENS IOL [id number] 19.5D - [id number]  Z DUP USE [id number] LENS IOL SN60WF 19.5D [id number] [employer]  Left 1 Implanted   STENT OPHTH L EYE TRABECULAR FLAVIO BYPS ISTNT - [id number]  STENT OPHTH L EYE TRABECULAR FLAVIO BYPS ISTNT [id number] [employer]WD [id number] Left 1 Implanted         Drains: * No LDAs found *    Findings:  Infection Present At Time Of Surgery (PATOS) (choose all levels that have infection present):  No infection present  Other Findings: none    Indications: The patient complains of painless progressive visual loss and reports difficulty with activities of daily living.  Cataract surgery has been recommended to improve vision for activities of daily living, and the patient has elected to proceed.    Procedure in Detail:  After informed consent was obtained, the patient was brought into the operating suite.  MAC with sedation and a retrobulbar block using a 50/50 mixture of lidocaine 2% and Marcaine 0.75% with added Amphatase was administered without complication.    The patient became apneic with sedation and required bag respiration by anesthesia until propofol fully metabolized.    The patient was prepped and draped in the usual sterile

## 2024-08-16 NOTE — PERIOP NOTE
Baljinder Erasmo  1950  [id number]    Situation:  Verbal report given from: Mary Jane  Procedure: Procedure(s):  LEFT EYE PHACOEMULSIFICATION WITH INTRAOCULAR LENS IMPLANT WITH I STENT (MAC W/RETROBULBAR)    Background:    Preoperative diagnosis: Combined forms of age-related cataract of left eye [H25.812]  Primary open angle glaucoma of left eye, moderate stage [H40.1122]    Postoperative diagnosis: * No post-op diagnosis entered *    :  Dr. Ferguson    Assistant(s): Circulator: Justin Diop RN  Scrub Person First: Addy Hall  Circulator Assist: Maria Eugenia Orourke RN    Specimens: * No specimens in log *    Assessment:  Intra-procedure medications         Anesthesia gave intra-procedure sedation and medications, see anesthesia flow sheet     Intravenous fluids: LR@ KVO     Vital signs stable       Recommendation:    Permission to share finding with Jania

## 2024-08-16 NOTE — PERIOP NOTE
Permission received to review discharge instructions and discuss private health information with Jania and will have someone with them after discharge

## 2024-08-16 NOTE — ANESTHESIA PRE PROCEDURE
Department of Anesthesiology  Preprocedure Note       Name:  Baljinder Zimmerman   Age:  73 y.o.  :  1950                                          MRN:  [id number]         Date:  2024      Surgeon: Surgeon(s):  Adeline Ferguson MD    Procedure: Procedure(s):  LEFT EYE PHACOEMULSIFICATION WITH INTRAOCULAR LENS IMPLANT WITH POSSIBLE MALYUGIN RING WITH POSSIBLE TRYPAN BLUE STAIN WITH ISTEN (MAC W/RETROBULBAR)    Medications prior to admission:   Prior to Admission medications    Medication Sig Start Date End Date Taking? Authorizing Provider   amoxicillin-clavulanate (AUGMENTIN) 875-125 MG per tablet 1 tablet Orally every 12 hrs for 7 days 24  Yes Provider, MD Spenser   atorvastatin (LIPITOR) 20 MG tablet Take 1 tablet by mouth daily for cholesterol   Yes Provider, MD Spenser   valsartan-hydroCHLOROthiazide (DIOVAN-HCT) 160-12.5 MG per tablet Take 1 tablet by mouth daily   Yes Provider, MD Spenser   metoprolol succinate (TOPROL XL) 25 MG extended release tablet Take 1 tablet by mouth daily 24  Yes Provider, MD Spenser   furosemide (LASIX) 20 MG tablet Take 2 tablets by mouth 2 times daily 22  Yes Automatic Reconciliation, Ar       Current medications:    Current Facility-Administered Medications   Medication Dose Route Frequency Provider Last Rate Last Admin   • tropicamide (MYDRIACYL) 1 % ophthalmic solution 1 drop  1 drop Left Eye See Admin Instructions Adeline Ferguson MD   1 drop at 24 1005   • dexAMETHasone (DECADRON) 1.2 mg, ceFAZolin (ANCEF) 30 mg   IntraOCUlar Once Adeline Ferguson MD       • neomycin-polymyxin-dexameth ophthalmic ointment   Left Eye Once Adeline Ferguson MD       • tetracaine (TETRAVISC) 0.5 % ophthalmic solution 1 drop  1 drop Left Eye Once Adeline Ferguson MD       • balanced salts (BSS) 10,000 drop with EPINEPHrine 0.5 mg   IntraOCUlar Once Adeline Ferguson MD       • sodium hyaluronate ophthalmic injection 9 mg  0.85 mL IntraOCUlar Once Adeline Ferguson MD

## 2024-08-16 NOTE — DISCHARGE INSTRUCTIONS
Dr. Adeline Ferguson [facility]  [facility]  [address] [place] [address] [phone]    Cataract Post Operative Instructions    Diet - you may eat a normal diet but avoid spicy or greasy tonight.    Activity - Limit heavy lifting - do not lift more than 20 pounds for the next 7 days.    Leave your eye patch on tonight. Your eye should remain closed under the patch. Your patch will be removed in Dr. Ferguson's office tomorrow.     Most people do not have significant pain after cataract surgery. You may take any over-the-counter pain medication that you normally take as needed.     You may resume all of your pre-operative medications.     You should have your postoperative drops. If you do not[facility] You will start eyedrops TOMORROW.     You have an appointment with Dr. Ferguson tomorrow in her office.    Date: ____08/17/24____________ Time: _10:00 AM________________    If you need to speak to Dr. Ferguson after business hours, please call [phone].    DO NOT TAKE SLEEPING MEDICATIONS OR ANTIANXIETY MEDICATIONS WHILE TAKING NARCOTIC PAIN MEDICATIONS,  ESPECIALLY THE NIGHT OF ANESTHESIA.    CPAP PATIENTS BE SURE TO WEAR MACHINE WHENEVER NAPPING OR SLEEPING.    DISCHARGE SUMMARY from Nurse    The following personal items collected during your admission are returned to you:   Dental Appliance:    Vision:    Hearing Aid:    Jewelry:    Clothing:    Other Valuables:    Valuables sent to safe:        PATIENT INSTRUCTIONS:    Anesthesia Discharge Instructions for Procedural Area requiring Sedation (MAC Anesthesia, Cath Lab, Endo and Radiology):   You have been given medications during your procedure that may affect your memory and mental judgement for the next 24 hours. During this time frame for your safety, please follow the instructions listed below :   Have a responsible adult to drive you home and be with you for at least 24 hours.

## 2024-08-29 ENCOUNTER — TELEPHONE (OUTPATIENT)
Age: 74
End: 2024-08-29

## 2025-01-29 ENCOUNTER — TELEPHONE (OUTPATIENT)
Age: 75
End: 2025-01-29

## (undated) DEVICE — AGENT VISCOELASTIC SODIUM HYALURONATE 10 MG/ML PROVISC

## (undated) DEVICE — PACK CATRCT CUST AS835752] ALCON LABORATORIES INC]

## (undated) DEVICE — GLOVE SURG SZ 65 THK91MIL LTX FREE SYN POLYISOPRENE

## (undated) DEVICE — SOLUTION IRRIG 500ML STRL H2O NONPYROGENIC

## (undated) DEVICE — SYRINGE MED 30ML STD CLR PLAS LUERLOCK TIP N CTRL DISP

## (undated) DEVICE — NEEDLE HYPO 30GA L0.5IN BGE POLYPR HUB S STL REG BVL STR

## (undated) DEVICE — SYRINGE MEDICAL 3ML CLEAR PLASTIC STANDARD NON CONTROL LUERLOCK TIP DISPOSABLE

## (undated) DEVICE — SLIT FULL HDL-2.8MM ANG C-CUT: Brand: SHARPOINT

## (undated) DEVICE — SYRINGE MED 10ML LUERLOCK TIP W/O SFTY DISP